# Patient Record
Sex: FEMALE | Race: BLACK OR AFRICAN AMERICAN | NOT HISPANIC OR LATINO | Employment: UNEMPLOYED | URBAN - METROPOLITAN AREA
[De-identification: names, ages, dates, MRNs, and addresses within clinical notes are randomized per-mention and may not be internally consistent; named-entity substitution may affect disease eponyms.]

---

## 2023-10-06 ENCOUNTER — APPOINTMENT (OUTPATIENT)
Dept: CARDIOLOGY | Facility: HOSPITAL | Age: 48
DRG: 640 | End: 2023-10-06
Payer: COMMERCIAL

## 2023-10-06 ENCOUNTER — APPOINTMENT (OUTPATIENT)
Dept: RADIOLOGY | Facility: HOSPITAL | Age: 48
DRG: 640 | End: 2023-10-06
Payer: COMMERCIAL

## 2023-10-06 ENCOUNTER — HOSPITAL ENCOUNTER (OUTPATIENT)
Facility: HOSPITAL | Age: 48
Setting detail: OBSERVATION
LOS: 1 days | Discharge: HOME | DRG: 640 | End: 2023-10-09
Attending: EMERGENCY MEDICINE | Admitting: STUDENT IN AN ORGANIZED HEALTH CARE EDUCATION/TRAINING PROGRAM
Payer: COMMERCIAL

## 2023-10-06 DIAGNOSIS — R41.82 ALTERED MENTAL STATUS: Primary | ICD-10-CM

## 2023-10-06 DIAGNOSIS — R40.4 TRANSIENT ALTERATION OF AWARENESS: ICD-10-CM

## 2023-10-06 DIAGNOSIS — E11.9 TYPE 2 DIABETES MELLITUS WITHOUT COMPLICATION, WITHOUT LONG-TERM CURRENT USE OF INSULIN (MULTI): ICD-10-CM

## 2023-10-06 PROBLEM — N18.6 ESRD (END STAGE RENAL DISEASE) ON DIALYSIS (MULTI): Status: ACTIVE | Noted: 2023-09-12

## 2023-10-06 PROBLEM — Z99.2 ESRD (END STAGE RENAL DISEASE) ON DIALYSIS (MULTI): Status: ACTIVE | Noted: 2023-09-12

## 2023-10-06 LAB
ALBUMIN SERPL BCP-MCNC: 3.9 G/DL (ref 3.4–5)
ALP SERPL-CCNC: 32 U/L (ref 33–110)
ALT SERPL W P-5'-P-CCNC: 8 U/L (ref 7–45)
ANION GAP BLDV CALCULATED.4IONS-SCNC: 8 MMOL/L (ref 10–25)
ANION GAP SERPL CALC-SCNC: 17 MMOL/L (ref 10–20)
APPEARANCE UR: CLEAR
AST SERPL W P-5'-P-CCNC: 31 U/L (ref 9–39)
ATRIAL RATE: 67 BPM
BASE EXCESS BLDV CALC-SCNC: 4 MMOL/L (ref -2–3)
BASOPHILS # BLD AUTO: 0.03 X10*3/UL (ref 0–0.1)
BASOPHILS NFR BLD AUTO: 0.8 %
BILIRUB SERPL-MCNC: 1.4 MG/DL (ref 0–1.2)
BILIRUB UR STRIP.AUTO-MCNC: NEGATIVE MG/DL
BNP SERPL-MCNC: 1132 PG/ML (ref 0–99)
BODY TEMPERATURE: 37 DEGREES CELSIUS
BUN SERPL-MCNC: 29 MG/DL (ref 6–23)
CA-I BLDV-SCNC: 1.15 MMOL/L (ref 1.1–1.33)
CALCIUM SERPL-MCNC: 9.5 MG/DL (ref 8.6–10.6)
CHLORIDE BLDV-SCNC: 101 MMOL/L (ref 98–107)
CHLORIDE SERPL-SCNC: 99 MMOL/L (ref 98–107)
CO2 SERPL-SCNC: 26 MMOL/L (ref 21–32)
COLOR UR: YELLOW
CREAT SERPL-MCNC: 7.45 MG/DL (ref 0.5–1.05)
CREAT SERPL-MCNC: 7.62 MG/DL (ref 0.5–1.05)
EOSINOPHIL # BLD AUTO: 0.07 X10*3/UL (ref 0–0.7)
EOSINOPHIL NFR BLD AUTO: 2 %
ERYTHROCYTE [DISTWIDTH] IN BLOOD BY AUTOMATED COUNT: 18.7 % (ref 11.5–14.5)
GFR SERPL CREATININE-BSD FRML MDRD: 6 ML/MIN/1.73M*2
GFR SERPL CREATININE-BSD FRML MDRD: 6 ML/MIN/1.73M*2
GLUCOSE BLD MANUAL STRIP-MCNC: 227 MG/DL (ref 74–99)
GLUCOSE BLDV-MCNC: 192 MG/DL (ref 74–99)
GLUCOSE SERPL-MCNC: 172 MG/DL (ref 74–99)
GLUCOSE UR STRIP.AUTO-MCNC: ABNORMAL MG/DL
HCO3 BLDV-SCNC: 30.2 MMOL/L (ref 22–26)
HCT VFR BLD AUTO: 37.9 % (ref 36–46)
HCT VFR BLD EST: 37 % (ref 36–46)
HGB BLD-MCNC: 11.6 G/DL (ref 12–16)
HGB BLDV-MCNC: 12.4 G/DL (ref 12–16)
IMM GRANULOCYTES # BLD AUTO: 0.01 X10*3/UL (ref 0–0.7)
IMM GRANULOCYTES NFR BLD AUTO: 0.3 % (ref 0–0.9)
INHALED O2 CONCENTRATION: 97 %
KETONES UR STRIP.AUTO-MCNC: NEGATIVE MG/DL
LACTATE BLDV-SCNC: 0.9 MMOL/L (ref 0.4–2)
LEUKOCYTE ESTERASE UR QL STRIP.AUTO: NEGATIVE
LYMPHOCYTES # BLD AUTO: 0.66 X10*3/UL (ref 1.2–4.8)
LYMPHOCYTES NFR BLD AUTO: 18.5 %
MAGNESIUM SERPL-MCNC: 2.02 MG/DL (ref 1.6–2.4)
MCH RBC QN AUTO: 27.6 PG (ref 26–34)
MCHC RBC AUTO-ENTMCNC: 30.6 G/DL (ref 32–36)
MCV RBC AUTO: 90 FL (ref 80–100)
MONOCYTES # BLD AUTO: 0.32 X10*3/UL (ref 0.1–1)
MONOCYTES NFR BLD AUTO: 9 %
NEUTROPHILS # BLD AUTO: 2.47 X10*3/UL (ref 1.2–7.7)
NEUTROPHILS NFR BLD AUTO: 69.4 %
NITRITE UR QL STRIP.AUTO: NEGATIVE
NRBC BLD-RTO: 0 /100 WBCS (ref 0–0)
OXYHGB MFR BLDV: 62.6 % (ref 45–75)
P AXIS: 56 DEGREES
P OFFSET: 185 MS
P ONSET: 118 MS
PCO2 BLDV: 51 MM HG (ref 41–51)
PH BLDV: 7.38 PH (ref 7.33–7.43)
PH UR STRIP.AUTO: 7 [PH]
PLATELET # BLD AUTO: 122 X10*3/UL (ref 150–450)
PMV BLD AUTO: ABNORMAL FL
PO2 BLDV: 44 MM HG (ref 35–45)
POTASSIUM BLDV-SCNC: 5.1 MMOL/L (ref 3.5–5.3)
POTASSIUM SERPL-SCNC: 4.9 MMOL/L (ref 3.5–5.3)
PR INTERVAL: 184 MS
PROT SERPL-MCNC: 8 G/DL (ref 6.4–8.2)
PROT UR STRIP.AUTO-MCNC: ABNORMAL MG/DL
Q ONSET: 210 MS
QRS COUNT: 11 BEATS
QRS DURATION: 98 MS
QT INTERVAL: 466 MS
QTC CALCULATION(BAZETT): 492 MS
QTC FREDERICIA: 483 MS
R AXIS: 30 DEGREES
RBC # BLD AUTO: 4.21 X10*6/UL (ref 4–5.2)
RBC # UR STRIP.AUTO: NEGATIVE /UL
RBC #/AREA URNS AUTO: NORMAL /HPF
SAO2 % BLDV: 65 % (ref 45–75)
SARS-COV-2 RNA RESP QL NAA+PROBE: NOT DETECTED
SODIUM BLDV-SCNC: 134 MMOL/L (ref 136–145)
SODIUM SERPL-SCNC: 137 MMOL/L (ref 136–145)
SP GR UR STRIP.AUTO: 1.02
T AXIS: 62 DEGREES
T OFFSET: 443 MS
UROBILINOGEN UR STRIP.AUTO-MCNC: <2 MG/DL
VENTRICULAR RATE: 67 BPM
WBC # BLD AUTO: 3.6 X10*3/UL (ref 4.4–11.3)
WBC #/AREA URNS AUTO: NORMAL /HPF

## 2023-10-06 PROCEDURE — 83880 ASSAY OF NATRIURETIC PEPTIDE: CPT | Performed by: EMERGENCY MEDICINE

## 2023-10-06 PROCEDURE — S0119 ONDANSETRON 4 MG: HCPCS | Performed by: EMERGENCY MEDICINE

## 2023-10-06 PROCEDURE — 2500000004 HC RX 250 GENERAL PHARMACY W/ HCPCS (ALT 636 FOR OP/ED): Performed by: EMERGENCY MEDICINE

## 2023-10-06 PROCEDURE — 83036 HEMOGLOBIN GLYCOSYLATED A1C: CPT | Performed by: NURSE PRACTITIONER

## 2023-10-06 PROCEDURE — 82947 ASSAY GLUCOSE BLOOD QUANT: CPT

## 2023-10-06 PROCEDURE — 2500000004 HC RX 250 GENERAL PHARMACY W/ HCPCS (ALT 636 FOR OP/ED): Mod: MUE | Performed by: NURSE PRACTITIONER

## 2023-10-06 PROCEDURE — 99285 EMERGENCY DEPT VISIT HI MDM: CPT | Performed by: EMERGENCY MEDICINE

## 2023-10-06 PROCEDURE — 74177 CT ABD & PELVIS W/CONTRAST: CPT | Performed by: RADIOLOGY

## 2023-10-06 PROCEDURE — G0378 HOSPITAL OBSERVATION PER HR: HCPCS

## 2023-10-06 PROCEDURE — 70496 CT ANGIOGRAPHY HEAD: CPT | Mod: MG

## 2023-10-06 PROCEDURE — 74177 CT ABD & PELVIS W/CONTRAST: CPT | Mod: MG

## 2023-10-06 PROCEDURE — 85025 COMPLETE CBC W/AUTO DIFF WBC: CPT | Performed by: EMERGENCY MEDICINE

## 2023-10-06 PROCEDURE — 96372 THER/PROPH/DIAG INJ SC/IM: CPT | Performed by: NURSE PRACTITIONER

## 2023-10-06 PROCEDURE — 99223 1ST HOSP IP/OBS HIGH 75: CPT | Performed by: NURSE PRACTITIONER

## 2023-10-06 PROCEDURE — 84132 ASSAY OF SERUM POTASSIUM: CPT | Performed by: EMERGENCY MEDICINE

## 2023-10-06 PROCEDURE — 70498 CT ANGIOGRAPHY NECK: CPT | Performed by: RADIOLOGY

## 2023-10-06 PROCEDURE — 70450 CT HEAD/BRAIN W/O DYE: CPT | Mod: 59,MG

## 2023-10-06 PROCEDURE — 93005 ELECTROCARDIOGRAM TRACING: CPT

## 2023-10-06 PROCEDURE — 83735 ASSAY OF MAGNESIUM: CPT | Performed by: EMERGENCY MEDICINE

## 2023-10-06 PROCEDURE — 2550000001 HC RX 255 CONTRASTS: Mod: SE | Performed by: EMERGENCY MEDICINE

## 2023-10-06 PROCEDURE — 70496 CT ANGIOGRAPHY HEAD: CPT | Performed by: RADIOLOGY

## 2023-10-06 PROCEDURE — 70450 CT HEAD/BRAIN W/O DYE: CPT | Performed by: RADIOLOGY

## 2023-10-06 PROCEDURE — 36415 COLL VENOUS BLD VENIPUNCTURE: CPT | Performed by: EMERGENCY MEDICINE

## 2023-10-06 PROCEDURE — 1100000001 HC PRIVATE ROOM DAILY

## 2023-10-06 PROCEDURE — 2500000001 HC RX 250 WO HCPCS SELF ADMINISTERED DRUGS (ALT 637 FOR MEDICARE OP): Performed by: NURSE PRACTITIONER

## 2023-10-06 PROCEDURE — 80053 COMPREHEN METABOLIC PANEL: CPT | Performed by: EMERGENCY MEDICINE

## 2023-10-06 PROCEDURE — 81001 URINALYSIS AUTO W/SCOPE: CPT | Performed by: EMERGENCY MEDICINE

## 2023-10-06 PROCEDURE — 2500000005 HC RX 250 GENERAL PHARMACY W/O HCPCS: Mod: SE | Performed by: EMERGENCY MEDICINE

## 2023-10-06 PROCEDURE — 87635 SARS-COV-2 COVID-19 AMP PRB: CPT | Performed by: EMERGENCY MEDICINE

## 2023-10-06 PROCEDURE — 2500000002 HC RX 250 W HCPCS SELF ADMINISTERED DRUGS (ALT 637 FOR MEDICARE OP, ALT 636 FOR OP/ED): Performed by: NURSE PRACTITIONER

## 2023-10-06 PROCEDURE — G1004 CDSM NDSC: HCPCS

## 2023-10-06 PROCEDURE — 82565 ASSAY OF CREATININE: CPT | Mod: 59 | Performed by: EMERGENCY MEDICINE

## 2023-10-06 RX ORDER — GABAPENTIN 300 MG/1
300 CAPSULE ORAL DAILY
COMMUNITY
Start: 2023-03-13 | End: 2024-03-11 | Stop reason: SINTOL

## 2023-10-06 RX ORDER — INSULIN GLARGINE 100 [IU]/ML
10 INJECTION, SOLUTION SUBCUTANEOUS NIGHTLY
COMMUNITY
Start: 2016-01-01 | End: 2023-10-06

## 2023-10-06 RX ORDER — ACETAMINOPHEN 500 MG
2000 TABLET ORAL
COMMUNITY
Start: 2023-08-08 | End: 2023-11-06

## 2023-10-06 RX ORDER — HYDRALAZINE HYDROCHLORIDE 25 MG/1
25 TABLET, FILM COATED ORAL 3 TIMES DAILY
COMMUNITY
End: 2023-10-09 | Stop reason: HOSPADM

## 2023-10-06 RX ORDER — ZOLPIDEM TARTRATE 10 MG/1
10 TABLET ORAL NIGHTLY PRN
COMMUNITY
End: 2023-10-09 | Stop reason: HOSPADM

## 2023-10-06 RX ORDER — HYDROCHLOROTHIAZIDE 25 MG/1
12.5 TABLET ORAL DAILY
Status: DISCONTINUED | OUTPATIENT
Start: 2023-10-06 | End: 2023-10-08

## 2023-10-06 RX ORDER — SIMVASTATIN 20 MG/1
20 TABLET, FILM COATED ORAL
COMMUNITY
Start: 2016-04-12

## 2023-10-06 RX ORDER — INSULIN GLARGINE 100 [IU]/ML
25 INJECTION, SOLUTION SUBCUTANEOUS 2 TIMES DAILY
COMMUNITY
End: 2023-10-09 | Stop reason: HOSPADM

## 2023-10-06 RX ORDER — CALCIUM CARBONATE 500(1250)
1 TABLET,CHEWABLE ORAL
COMMUNITY
Start: 2015-09-20

## 2023-10-06 RX ORDER — FELODIPINE 10 MG/1
10 TABLET, EXTENDED RELEASE ORAL
COMMUNITY
Start: 2023-08-08 | End: 2024-03-11

## 2023-10-06 RX ORDER — GABAPENTIN 300 MG/1
300 CAPSULE ORAL NIGHTLY
Status: DISCONTINUED | OUTPATIENT
Start: 2023-10-06 | End: 2023-10-09 | Stop reason: HOSPADM

## 2023-10-06 RX ORDER — ROPINIROLE 1 MG/1
1 TABLET, FILM COATED ORAL NIGHTLY
COMMUNITY

## 2023-10-06 RX ORDER — CALCITRIOL 0.25 UG/1
0.25 CAPSULE ORAL DAILY
Status: DISCONTINUED | OUTPATIENT
Start: 2023-10-06 | End: 2023-10-09 | Stop reason: HOSPADM

## 2023-10-06 RX ORDER — SIMVASTATIN 20 MG/1
20 TABLET, FILM COATED ORAL NIGHTLY
Status: DISCONTINUED | OUTPATIENT
Start: 2023-10-06 | End: 2023-10-09 | Stop reason: HOSPADM

## 2023-10-06 RX ORDER — FERROUS SULFATE 325(65) MG
65 TABLET ORAL 2 TIMES DAILY
COMMUNITY

## 2023-10-06 RX ORDER — ROPINIROLE 1 MG/1
1 TABLET, FILM COATED ORAL NIGHTLY
Status: DISCONTINUED | OUTPATIENT
Start: 2023-10-06 | End: 2023-10-09 | Stop reason: HOSPADM

## 2023-10-06 RX ORDER — INSULIN ASPART 100 [IU]/ML
0-10 INJECTION, SOLUTION INTRAVENOUS; SUBCUTANEOUS
COMMUNITY

## 2023-10-06 RX ORDER — ZOLPIDEM TARTRATE 5 MG/1
10 TABLET ORAL NIGHTLY PRN
Status: DISCONTINUED | OUTPATIENT
Start: 2023-10-06 | End: 2023-10-09 | Stop reason: HOSPADM

## 2023-10-06 RX ORDER — LANSOPRAZOLE 30 MG/1
30 CAPSULE, DELAYED RELEASE ORAL
COMMUNITY
Start: 2016-03-10 | End: 2023-10-06 | Stop reason: ALTCHOICE

## 2023-10-06 RX ORDER — HYDROXYZINE HYDROCHLORIDE 10 MG/1
10 TABLET, FILM COATED ORAL EVERY 8 HOURS PRN
COMMUNITY
Start: 2023-09-12

## 2023-10-06 RX ORDER — INSULIN LISPRO 100 [IU]/ML
0-10 INJECTION, SOLUTION INTRAVENOUS; SUBCUTANEOUS
Status: DISCONTINUED | OUTPATIENT
Start: 2023-10-06 | End: 2023-10-09 | Stop reason: HOSPADM

## 2023-10-06 RX ORDER — PREGABALIN 75 MG/1
75 CAPSULE ORAL 2 TIMES DAILY
COMMUNITY
Start: 2016-04-20 | End: 2024-03-11 | Stop reason: SINTOL

## 2023-10-06 RX ORDER — CALCIUM ACETATE 667 MG
2668 TABLET ORAL
Status: DISCONTINUED | OUTPATIENT
Start: 2023-10-07 | End: 2023-10-07

## 2023-10-06 RX ORDER — INSULIN GLARGINE 100 [IU]/ML
30 INJECTION, SOLUTION SUBCUTANEOUS EVERY MORNING
COMMUNITY
Start: 2016-01-20 | End: 2023-10-06

## 2023-10-06 RX ORDER — CHOLECALCIFEROL (VITAMIN D3) 25 MCG
2000 TABLET ORAL DAILY
Status: DISCONTINUED | OUTPATIENT
Start: 2023-10-07 | End: 2023-10-09 | Stop reason: HOSPADM

## 2023-10-06 RX ORDER — FERROUS SULFATE 325(65) MG
65 TABLET ORAL 2 TIMES DAILY
Status: DISCONTINUED | OUTPATIENT
Start: 2023-10-06 | End: 2023-10-08

## 2023-10-06 RX ORDER — CALCIUM ACETATE 667 MG
2001 TABLET ORAL
COMMUNITY
Start: 2023-07-11 | End: 2024-04-25

## 2023-10-06 RX ORDER — LOPERAMIDE HYDROCHLORIDE 2 MG/1
2 CAPSULE ORAL 4 TIMES DAILY PRN
Status: DISCONTINUED | OUTPATIENT
Start: 2023-10-06 | End: 2023-10-09 | Stop reason: HOSPADM

## 2023-10-06 RX ORDER — CARVEDILOL 25 MG/1
25 TABLET ORAL 2 TIMES DAILY
Status: DISCONTINUED | OUTPATIENT
Start: 2023-10-06 | End: 2023-10-09 | Stop reason: HOSPADM

## 2023-10-06 RX ORDER — DICLOFENAC SODIUM 10 MG/G
4 GEL TOPICAL 2 TIMES DAILY PRN
COMMUNITY
Start: 2016-02-25

## 2023-10-06 RX ORDER — FUROSEMIDE 20 MG/1
20 TABLET ORAL
Status: DISCONTINUED | OUTPATIENT
Start: 2023-10-07 | End: 2023-10-09 | Stop reason: HOSPADM

## 2023-10-06 RX ORDER — METOCLOPRAMIDE 10 MG/1
10 TABLET ORAL 2 TIMES DAILY
COMMUNITY
Start: 2016-03-03

## 2023-10-06 RX ORDER — VALSARTAN 320 MG/1
320 TABLET ORAL DAILY
Status: DISCONTINUED | OUTPATIENT
Start: 2023-10-06 | End: 2023-10-09 | Stop reason: HOSPADM

## 2023-10-06 RX ORDER — AMLODIPINE BESYLATE 10 MG/1
10 TABLET ORAL DAILY
Status: DISCONTINUED | OUTPATIENT
Start: 2023-10-06 | End: 2023-10-09 | Stop reason: HOSPADM

## 2023-10-06 RX ORDER — VALSARTAN AND HYDROCHLOROTHIAZIDE 320; 12.5 MG/1; MG/1
1 TABLET, FILM COATED ORAL DAILY
COMMUNITY
End: 2024-05-07 | Stop reason: ALTCHOICE

## 2023-10-06 RX ORDER — CARVEDILOL 25 MG/1
25 TABLET ORAL 2 TIMES DAILY
COMMUNITY

## 2023-10-06 RX ORDER — HYDROXYZINE HYDROCHLORIDE 25 MG/1
25 TABLET, FILM COATED ORAL EVERY 6 HOURS PRN
Status: DISCONTINUED | OUTPATIENT
Start: 2023-10-06 | End: 2023-10-09 | Stop reason: HOSPADM

## 2023-10-06 RX ORDER — ONDANSETRON 4 MG/1
4 TABLET, ORALLY DISINTEGRATING ORAL ONCE
Status: COMPLETED | OUTPATIENT
Start: 2023-10-06 | End: 2023-10-06

## 2023-10-06 RX ORDER — NAPROXEN SODIUM 220 MG/1
81 TABLET, FILM COATED ORAL
COMMUNITY
Start: 2016-05-26

## 2023-10-06 RX ORDER — DEXTROSE MONOHYDRATE 100 MG/ML
0.3 INJECTION, SOLUTION INTRAVENOUS ONCE AS NEEDED
Status: DISCONTINUED | OUTPATIENT
Start: 2023-10-06 | End: 2023-10-06

## 2023-10-06 RX ORDER — VALSARTAN AND HYDROCHLOROTHIAZIDE 160; 25 MG/1; MG/1
1 TABLET ORAL
COMMUNITY
Start: 2016-01-08 | End: 2023-10-06

## 2023-10-06 RX ORDER — INSULIN ASPART 100 [IU]/ML
5 INJECTION, SOLUTION INTRAVENOUS; SUBCUTANEOUS
COMMUNITY
End: 2023-10-09 | Stop reason: HOSPADM

## 2023-10-06 RX ORDER — INSULIN GLARGINE 100 [IU]/ML
25 INJECTION, SOLUTION SUBCUTANEOUS 2 TIMES DAILY
Status: DISCONTINUED | OUTPATIENT
Start: 2023-10-06 | End: 2023-10-08

## 2023-10-06 RX ORDER — HYDRALAZINE HYDROCHLORIDE 25 MG/1
25 TABLET, FILM COATED ORAL 3 TIMES DAILY
Status: DISCONTINUED | OUTPATIENT
Start: 2023-10-06 | End: 2023-10-09 | Stop reason: HOSPADM

## 2023-10-06 RX ORDER — CALCITRIOL 0.25 UG/1
0.25 CAPSULE ORAL DAILY
COMMUNITY
End: 2023-10-09 | Stop reason: HOSPADM

## 2023-10-06 RX ORDER — ONDANSETRON HYDROCHLORIDE 2 MG/ML
4 INJECTION, SOLUTION INTRAVENOUS EVERY 6 HOURS PRN
Status: DISCONTINUED | OUTPATIENT
Start: 2023-10-06 | End: 2023-10-09 | Stop reason: HOSPADM

## 2023-10-06 RX ORDER — FUROSEMIDE 20 MG/1
20 TABLET ORAL
COMMUNITY
Start: 2016-02-25

## 2023-10-06 RX ORDER — DEXTROSE 50 % IN WATER (D50W) INTRAVENOUS SYRINGE
25
Status: DISCONTINUED | OUTPATIENT
Start: 2023-10-06 | End: 2023-10-06

## 2023-10-06 RX ORDER — DICLOFENAC SODIUM 10 MG/G
4 GEL TOPICAL 2 TIMES DAILY PRN
Status: DISCONTINUED | OUTPATIENT
Start: 2023-10-06 | End: 2023-10-09 | Stop reason: HOSPADM

## 2023-10-06 RX ORDER — OXYCODONE AND ACETAMINOPHEN 5; 325 MG/1; MG/1
1 TABLET ORAL EVERY 8 HOURS PRN
Status: ACTIVE | OUTPATIENT
Start: 2023-10-06 | End: 2023-10-07

## 2023-10-06 RX ORDER — CARVEDILOL 12.5 MG/1
12.5 TABLET ORAL 2 TIMES DAILY
COMMUNITY
Start: 2016-01-08 | End: 2023-10-06

## 2023-10-06 RX ORDER — NAPROXEN SODIUM 220 MG/1
81 TABLET, FILM COATED ORAL DAILY
Status: DISCONTINUED | OUTPATIENT
Start: 2023-10-06 | End: 2023-10-09 | Stop reason: HOSPADM

## 2023-10-06 RX ORDER — DEXTROSE MONOHYDRATE 100 MG/ML
0.3 INJECTION, SOLUTION INTRAVENOUS ONCE AS NEEDED
Status: DISCONTINUED | OUTPATIENT
Start: 2023-10-06 | End: 2023-10-09 | Stop reason: HOSPADM

## 2023-10-06 RX ADMIN — HYDROXYZINE HYDROCHLORIDE 25 MG: 25 TABLET, FILM COATED ORAL at 23:48

## 2023-10-06 RX ADMIN — ASPIRIN 81 MG CHEWABLE TABLET 81 MG: 81 TABLET CHEWABLE at 20:14

## 2023-10-06 RX ADMIN — ONDANSETRON 4 MG: 4 TABLET, ORALLY DISINTEGRATING ORAL at 09:08

## 2023-10-06 RX ADMIN — AMLODIPINE BESYLATE 10 MG: 10 TABLET ORAL at 20:14

## 2023-10-06 RX ADMIN — CALCITRIOL CAPSULES 0.25 MCG 0.25 MCG: 0.25 CAPSULE ORAL at 23:17

## 2023-10-06 RX ADMIN — CARVEDILOL 25 MG: 25 TABLET, FILM COATED ORAL at 20:14

## 2023-10-06 RX ADMIN — SIMVASTATIN 20 MG: 20 TABLET, FILM COATED ORAL at 23:48

## 2023-10-06 RX ADMIN — HYDRALAZINE HYDROCHLORIDE 25 MG: 25 TABLET, FILM COATED ORAL at 23:16

## 2023-10-06 RX ADMIN — INSULIN GLARGINE 25 UNITS: 100 INJECTION, SOLUTION SUBCUTANEOUS at 23:18

## 2023-10-06 RX ADMIN — IOHEXOL 110 ML: 350 INJECTION, SOLUTION INTRAVENOUS at 14:59

## 2023-10-06 RX ADMIN — FERROUS SULFATE TAB 325 MG (65 MG ELEMENTAL FE) 65 MG OF IRON: 325 (65 FE) TAB at 23:17

## 2023-10-06 RX ADMIN — ROPINIROLE HYDROCHLORIDE 1 MG: 1 TABLET, FILM COATED ORAL at 23:17

## 2023-10-06 RX ADMIN — GABAPENTIN 300 MG: 300 CAPSULE ORAL at 23:50

## 2023-10-06 ASSESSMENT — LIFESTYLE VARIABLES
EVER FELT BAD OR GUILTY ABOUT YOUR DRINKING: NO
HAVE YOU EVER FELT YOU SHOULD CUT DOWN ON YOUR DRINKING: NO
EVER HAD A DRINK FIRST THING IN THE MORNING TO STEADY YOUR NERVES TO GET RID OF A HANGOVER: NO
HAVE PEOPLE ANNOYED YOU BY CRITICIZING YOUR DRINKING: NO

## 2023-10-06 ASSESSMENT — ENCOUNTER SYMPTOMS
FATIGUE: 1
FLANK PAIN: 0
PSYCHIATRIC NEGATIVE: 1
EYES NEGATIVE: 1
DYSURIA: 0
TROUBLE SWALLOWING: 0
NAUSEA: 1
NUMBNESS: 0
FREQUENCY: 0
ABDOMINAL PAIN: 0
COUGH: 0
SEIZURES: 0
POLYDIPSIA: 0
DIARRHEA: 1
CHILLS: 0
DIAPHORESIS: 0
SHORTNESS OF BREATH: 1
APPETITE CHANGE: 0
MUSCULOSKELETAL NEGATIVE: 1
VOMITING: 1
FEVER: 0
PALPITATIONS: 0
POLYPHAGIA: 0
DIZZINESS: 0
SORE THROAT: 0
CONSTIPATION: 0
WHEEZING: 0
BLOOD IN STOOL: 0
DIFFICULTY URINATING: 0
WEAKNESS: 0

## 2023-10-06 ASSESSMENT — PAIN - FUNCTIONAL ASSESSMENT
PAIN_FUNCTIONAL_ASSESSMENT: 0-10
PAIN_FUNCTIONAL_ASSESSMENT: 0-10

## 2023-10-06 ASSESSMENT — PAIN SCALES - GENERAL
PAINLEVEL_OUTOF10: 0 - NO PAIN
PAINLEVEL_OUTOF10: 0 - NO PAIN

## 2023-10-06 ASSESSMENT — COLUMBIA-SUICIDE SEVERITY RATING SCALE - C-SSRS
6. HAVE YOU EVER DONE ANYTHING, STARTED TO DO ANYTHING, OR PREPARED TO DO ANYTHING TO END YOUR LIFE?: NO
1. IN THE PAST MONTH, HAVE YOU WISHED YOU WERE DEAD OR WISHED YOU COULD GO TO SLEEP AND NOT WAKE UP?: NO
2. HAVE YOU ACTUALLY HAD ANY THOUGHTS OF KILLING YOURSELF?: NO

## 2023-10-06 NOTE — ED PROVIDER NOTES
"HPI   Chief Complaint   Patient presents with    Flu Symptoms       Patient is a 48yo  with a PMH of ESRD (T/Th/Sa), HTN, insulin dependent T2DM, and restless leg syndrome presenting to the ED today with complaints of increasing sleepiness, nausea, vomiting, and diarrhea since yesterday after missing her HD appointment. History is limited by patient's somnolence during discussion. Patient reports yesterday she was sleepy/slow moving which caused her to miss her transportation to dialysis. She reports that this morning, her sleepiness has progressed, and she has developed nausea, vomiting, and diarrhea. She denies any headache, chest pain, shortness of breath, abdominal pain, cough, congestion, rhinorrhea, melena, or hematochezia. Patient has no recent travel or sick contacts. Patient denies any alcohol use or illicit substances, although she does report taking a medication that starts with \"ZO\" that she takes regularly last night that helps her sleep. Upon chart review, as of January 2023, the patient was not on any sleep medications that match that description. Patient is unable to report current medications or her family physician. Patient lives at home with her two kids (age 13 and 15).     After initial evaluation, I spoke with the patient's primary care physician, Dr. Rodriguez. Her PCP informed that recently the patient has been having episodes of full body pruritus that they have been trying to treat. He doesn't think it to be from a uremic cause due to her consistent dialysis, but has been treating the symptom with hydroxyzine. Patient has also noted that she is taking benadryl OTC on top of this. PCP mentioned that patient is currently taking hydroxyzine, benadryl OTC, ambien, gabapentin, and ropinirole at night. PCP believes her somnolence may be an effect of polypharmacy and plans on addressing her medications outpatient.    Son (Isaías Bhakta) [phone]    Medications (per chart review " 1/11/2023):  Pregabalin 75mg BID  Simvastatin 220mg every day  Lansoprazole 30mg every day  Metoclopramide 10mg BID  Diclofenac sodium 1% topical gel  Furosemide 20mg every day  Metoprolol 25mg BID  Novolog 8-14 units under skin TID  Lantus 30 units at 8am and 10 units at 10pm  Valsartan-hydrochlorothiazide 160-25mg every day  Carvedilol 12.5 BID  Aspirin-acetaminophen-caffeine [phone] PRN pain  Lidocaine patch 5% 1 patch/24 hours  Calcium 500mg every day  Docusate sodium 100mg BID  Aspirin 81mg every day           History provided by:  Patient and medical records  History limited by:  Mental status change   used: No                        Clay Coma Scale Score: 15                  Patient History   No past medical history on file.  No past surgical history on file.  No family history on file.  Social History     Tobacco Use    Smoking status: Not on file    Smokeless tobacco: Not on file   Substance Use Topics    Alcohol use: Not on file    Drug use: Not on file       Physical Exam   ED Triage Vitals [10/06/23 0824]   Temp Heart Rate Resp BP   36.4 °C (97.5 °F) 64 18 (!) 169/92      SpO2 Temp src Heart Rate Source Patient Position   96 % -- Monitor --      BP Location FiO2 (%)     Right arm --       Physical Exam  Vitals and nursing note reviewed.   Constitutional:       General: She is not in acute distress.     Appearance: She is well-developed. She is obese.      Comments: Pt somnolent on exam, actively falling asleep during history taking, unable to finish sentences due to somnolence   HENT:      Head: Normocephalic and atraumatic.      Nose: Nose normal.      Mouth/Throat:      Mouth: Mucous membranes are moist.      Pharynx: Oropharynx is clear.   Eyes:      Extraocular Movements: Extraocular movements intact.      Conjunctiva/sclera: Conjunctivae normal.      Comments: Miotic pupils   Cardiovascular:      Rate and Rhythm: Normal rate and regular rhythm.      Pulses: Normal pulses.       Heart sounds: Normal heart sounds. No murmur heard.  Pulmonary:      Effort: Pulmonary effort is normal. No respiratory distress.      Breath sounds: Normal breath sounds.   Abdominal:      General: Abdomen is flat. There is no distension.      Palpations: Abdomen is soft.      Tenderness: There is no abdominal tenderness.   Musculoskeletal:         General: No swelling.      Cervical back: Neck supple.   Skin:     General: Skin is warm and dry.      Capillary Refill: Capillary refill takes less than 2 seconds.   Neurological:      General: No focal deficit present.      Mental Status: She is oriented to person, place, and time.      Cranial Nerves: No cranial nerve deficit.      Sensory: No sensory deficit.      Motor: No weakness.      Comments: Patient oriented to person, date, and situation. Patient is unaware what hospital she is in.     Initial finger to nose testing was unable to be tested secondary to somnolence. Heel to shin was normal.    Patient was somnolent and falling asleep during history taking.   Psychiatric:         Mood and Affect: Mood normal.       ED Course & MDM   Diagnoses as of 10/07/23 4628   Altered mental status       Medical Decision Making  Patient is a 48yo  with a PMH of ESRD (T/Th/Sa), HTN, insulin dependent T2DM, and restless leg syndrome presenting to the ED today with complaints of increasing sleepiness, nausea, vomiting, and diarrhea since yesterday after missing her HD appointment. Upon admission to the ED, patient was found to be vitally stable except some slight hypertension at 169/92. Patient was somnolent on exam with miotic pupils, no focal neuro deficits, and unremarkable heart, lung, and abdominal exam. Labs showed an elevated BNP, Creatinine, and BUN expected in ESRD patient that missed HD. CT Head was unremarkable for any acute intracranial pathology. After speaking with patient's PCP and most of her labs being benign or expected with her skipped HD, patient is  diagnosed with altered mental status likely due to her polypharmacy including hydroxyzine, OTC benadryl, ropinirole, zolpidem, and gabapentin. Miotic pupils do not fit the anticholinergic picture so a CTA head and neck is ordered to rule out pontine stroke.    Patient was signed out at 1530 to student Dr. Scott and attending Dr. Anderson. Patient was vitally stable and less somnolent than initial exam. Patient is pending official reads of her CT abdomen and pelvis and CTA head/neck. Dispo is anticipated admission after CT and CTA are read. Patient has already been denied admission to CDU for observation due to ESRD on HD status.        Procedure  Procedures     Kim Rico  10/07/23 9672

## 2023-10-06 NOTE — PROGRESS NOTES
"Rissa Bhakta is a 47 y.o. female with a PMHx of HTN, T2DM, and ESRD (dialysis tue,Thurs, Sat) presenting with chief complaint of nausea and vomiting.   Patient was signed out to me by Kim Rico, MS4  BNP: 1,132, Cr: 7.45, eGFR:6, BUN:29 other labs were unremarkable   Patient is stable. Vitals show /93 other vitals wnl.  Patient was noted to be lethargic most likely 2/2 to polypharmacy   CT head was unremarkable   CT angio of head and neck was unremarkable   Team noted non focal neuro findings on physical exam   Patient will be admitted to medicine     Objective     Physical Exam  Vitals reviewed.   Constitutional:       General: She is sleeping. She is not in acute distress.  Eyes:      Extraocular Movements:      Right eye: No nystagmus.      Left eye: No nystagmus.   Cardiovascular:      Rate and Rhythm: Normal rate and regular rhythm.      Heart sounds: S1 normal and S2 normal. No murmur heard.     No friction rub.   Pulmonary:      Effort: Pulmonary effort is normal. No respiratory distress.      Breath sounds: Normal breath sounds and air entry. No decreased breath sounds, wheezing, rhonchi or rales.   Abdominal:      Tenderness: There is no abdominal tenderness.   Neurological:      Mental Status: She is lethargic.      Sensory: No sensory deficit.      Motor: Motor function is intact.      Coordination: Finger-Nose-Finger Test abnormal.      Comments: Abnormal finger to nose bilaterally. Decreased sensation in the left lower extremity.          Last Recorded Vitals  Blood pressure (!) 181/93, pulse 70, temperature 36.4 °C (97.5 °F), resp. rate 16, height 1.626 m (5' 4\"), weight 97.5 kg (215 lb), SpO2 98 %.      BLADIMIR PEREZ"

## 2023-10-06 NOTE — ED TRIAGE NOTES
Pt presents to ED today for nausea, weakness, diarrhea. Missed dialysis Thursday due to not feel well. Pt feels more lethargic than normal, and appears very drowsy.

## 2023-10-06 NOTE — H&P
History Of Present Illness  Rissa Bhakta is a 47 y.o. female who presented to the ED  for further evaluation of the following complaints: increased sleepiness, n/v/d, SOB, and missed HD yesterday (10/5). Pt reports that she missed HD yesterday because she was sleepy and moving slow which caused her to miss her dialysis transportation. Pt reports that nausea and vomiting started this morning and she had one episode of emesis prior to ED presentation. Diarrhea has been present on and off for months per report of pt. She denies any recent changes in her diet. Pt was somnolent on arrival and kept falling asleep during pt interview with ED provider. Pt denies any recent sick contacts or travel. ED provider was able to speak with pt's PCP (Dr. Rodriguez) who reports that pt has been complaining of full body pruritus which he has been trying to manage. PCP of pt does not think pruritus is r/t uremic cause because pt consistently gets dialysis. He reports that pt has been taking Hydroxine, OTC Benadryl, Ambien, and Gabapentin outpatient and her somnolence may be r/t polypharmacy. Labs obtained on admit notable for leukopenia, impaired renal function, and anemia. Labs as expected given pt history of ESRD. No acute findings seen on imaging. Decision made to admit pt to medicine service under observation for missed HD and n/v/d.     Past Medical History  Past Medical History:   Diagnosis Date    Anemia associated with chronic renal failure     Diabetes mellitus (CMS/formerly [facility])     ESRD (end stage renal disease) on dialysis (CMS/formerly [facility])     Hyperlipidemia     Hypertension     Insomnia     POTS (postural orthostatic tachycardia syndrome)     Restless leg syndrome        Surgical History  History reviewed. No pertinent surgical history.     Social History  She reports that she has never smoked. She has never used smokeless tobacco. She reports that she does not currently use alcohol after a past usage of about 1.0 standard drink of  alcohol per week. She reports that she does not use drugs.    Family History  No family history on file.     Allergies  Cherry, Coconut flavor, Morphine hcl, Mushroom, and Saluda    Review of Systems   Constitutional:  Positive for fatigue. Negative for appetite change, chills, diaphoresis and fever.   HENT:  Negative for congestion, hearing loss, sore throat and trouble swallowing.    Eyes: Negative.    Respiratory:  Positive for shortness of breath. Negative for cough and wheezing.    Cardiovascular:  Negative for chest pain and palpitations.   Gastrointestinal:  Positive for diarrhea, nausea and vomiting. Negative for abdominal pain, blood in stool and constipation.   Endocrine: Negative for polydipsia, polyphagia and polyuria.   Genitourinary:  Negative for difficulty urinating, dysuria, flank pain and frequency.   Musculoskeletal: Negative.    Skin: Negative.    Neurological:  Negative for dizziness, seizures, syncope, weakness and numbness.   Psychiatric/Behavioral: Negative.          Physical Exam  HENT:      Head: Normocephalic and atraumatic.      Right Ear: External ear normal.      Left Ear: External ear normal.      Nose: Nose normal. No rhinorrhea.      Mouth/Throat:      Mouth: Mucous membranes are moist.      Pharynx: No posterior oropharyngeal erythema.   Eyes:      Extraocular Movements: Extraocular movements intact.      Conjunctiva/sclera: Conjunctivae normal.      Pupils: Pupils are equal, round, and reactive to light.   Cardiovascular:      Rate and Rhythm: Normal rate and regular rhythm.      Pulses: Normal pulses.      Heart sounds: Normal heart sounds. No murmur heard.  Pulmonary:      Effort: Pulmonary effort is normal.      Breath sounds: Normal breath sounds. No wheezing or rales.   Abdominal:      General: Abdomen is flat. Bowel sounds are normal.      Palpations: Abdomen is soft.   Musculoskeletal:         General: Normal range of motion.      Cervical back: Normal range of motion  "and neck supple.   Skin:     General: Skin is warm and dry.      Coloration: Skin is not jaundiced.      Comments: L upper arm AV fistula (+thrill/bruit)   Neurological:      General: No focal deficit present.      Mental Status: She is alert and oriented to person, place, and time. Mental status is at baseline.   Psychiatric:         Mood and Affect: Mood normal.         Behavior: Behavior normal.          Last Recorded Vitals  Blood pressure 157/76, pulse 72, temperature 36.4 °C (97.5 °F), resp. rate 16, height 1.626 m (5' 4\"), weight 97.5 kg (215 lb), SpO2 99 %.    Scheduled medications    Continuous medications  Scheduled medications  amLODIPine, 10 mg, oral, Daily  aspirin, 81 mg, oral, Daily  calcitriol, 0.25 mcg, oral, Daily  [START ON 10/7/2023] calcium acetate, 2,668 mg, oral, TID with meals  carvedilol, 25 mg, oral, BID  [START ON 10/7/2023] cholecalciferol, 2,000 Units, oral, Daily  ferrous sulfate, 65 mg of iron, oral, BID  [START ON 10/7/2023] furosemide, 20 mg, oral, Daily  gabapentin, 300 mg, oral, Nightly  hydrALAZINE, 25 mg, oral, TID  insulin glargine, 25 Units, subcutaneous, BID  insulin lispro, 0-10 Units, subcutaneous, TID with meals  simvastatin, 20 mg, oral, Nightly  valsartan 320 mg, hydroCHLOROthiazide 12.5 mg for Diovan HCT, , oral, Daily      Continuous medications     PRN medications  PRN medications: dextrose 10 % in water (D10W), diclofenac sodium, glucagon, loperamide, ondansetron, zolpidem    PRN medications: dextrose 10 % in water (D10W), diclofenac sodium, glucagon, loperamide, ondansetron, zolpidem    Lab Results   Component Value Date    HGBA1C 8.4 (H) 11/08/2022     Results for orders placed or performed during the hospital encounter of 10/06/23 (from the past 96 hour(s))   CBC and Auto Differential   Result Value Ref Range    WBC 3.6 (L) 4.4 - 11.3 x10*3/uL    nRBC 0.0 0.0 - 0.0 /100 WBCs    RBC 4.21 4.00 - 5.20 x10*6/uL    Hemoglobin 11.6 (L) 12.0 - 16.0 g/dL    Hematocrit 37.9 " 36.0 - 46.0 %    MCV 90 80 - 100 fL    MCH 27.6 26.0 - 34.0 pg    MCHC 30.6 (L) 32.0 - 36.0 g/dL    RDW 18.7 (H) 11.5 - 14.5 %    Platelets 122 (L) 150 - 450 x10*3/uL    MPV      Neutrophils % 69.4 40.0 - 80.0 %    Immature Granulocytes %, Automated 0.3 0.0 - 0.9 %    Lymphocytes % 18.5 13.0 - 44.0 %    Monocytes % 9.0 2.0 - 10.0 %    Eosinophils % 2.0 0.0 - 6.0 %    Basophils % 0.8 0.0 - 2.0 %    Neutrophils Absolute 2.47 1.20 - 7.70 x10*3/uL    Immature Granulocytes Absolute, Automated 0.01 0.00 - 0.70 x10*3/uL    Lymphocytes Absolute 0.66 (L) 1.20 - 4.80 x10*3/uL    Monocytes Absolute 0.32 0.10 - 1.00 x10*3/uL    Eosinophils Absolute 0.07 0.00 - 0.70 x10*3/uL    Basophils Absolute 0.03 0.00 - 0.10 x10*3/uL   Comprehensive metabolic panel   Result Value Ref Range    Glucose 172 (H) 74 - 99 mg/dL    Sodium 137 136 - 145 mmol/L    Potassium 4.9 3.5 - 5.3 mmol/L    Chloride 99 98 - 107 mmol/L    Bicarbonate 26 21 - 32 mmol/L    Anion Gap 17 10 - 20 mmol/L    Urea Nitrogen 29 (H) 6 - 23 mg/dL    Creatinine 7.62 (H) 0.50 - 1.05 mg/dL    eGFR 6 (L) >60 mL/min/1.73m*2    Calcium 9.5 8.6 - 10.6 mg/dL    Albumin 3.9 3.4 - 5.0 g/dL    Alkaline Phosphatase 32 (L) 33 - 110 U/L    Total Protein 8.0 6.4 - 8.2 g/dL    AST 31 9 - 39 U/L    Bilirubin, Total 1.4 (H) 0.0 - 1.2 mg/dL    ALT 8 7 - 45 U/L   Magnesium   Result Value Ref Range    Magnesium 2.02 1.60 - 2.40 mg/dL   Blood Gas Venous Full Panel   Result Value Ref Range    POCT pH, Venous 7.38 7.33 - 7.43 pH    POCT pCO2, Venous 51 41 - 51 mm Hg    POCT pO2, Venous 44 35 - 45 mm Hg    POCT SO2, Venous 65 45 - 75 %    POCT Oxy Hemoglobin, Venous 62.6 45.0 - 75.0 %    POCT Hematocrit Calculated, Venous 37.0 36.0 - 46.0 %    POCT Sodium, Venous 134 (L) 136 - 145 mmol/L    POCT Potassium, Venous 5.1 3.5 - 5.3 mmol/L    POCT Chloride, Venous 101 98 - 107 mmol/L    POCT Ionized Calicum, Venous 1.15 1.10 - 1.33 mmol/L    POCT Glucose, Venous 192 (H) 74 - 99 mg/dL    POCT Lactate,  Venous 0.9 0.4 - 2.0 mmol/L    POCT Base Excess, Venous 4.0 (H) -2.0 - 3.0 mmol/L    POCT HCO3 Calculated, Venous 30.2 (H) 22.0 - 26.0 mmol/L    POCT Hemoglobin, Venous 12.4 12.0 - 16.0 g/dL    POCT Anion Gap, Venous 8.0 (L) 10.0 - 25.0 mmol/L    Patient Temperature 37.0 degrees Celsius    FiO2 97 %   B-Type Natriuretic Peptide   Result Value Ref Range    BNP 1,132 (H) 0 - 99 pg/mL   Sars-CoV-2 PCR, Symptomatic   Result Value Ref Range    Coronavirus 2019, PCR Not Detected Not Detected   Urinalysis with Reflex Microscopic   Result Value Ref Range    Color, Urine Yellow Straw, Yellow    Appearance, Urine Clear Clear    Specific Gravity, Urine 1.016 1.005 - 1.035    pH, Urine 7.0 5.0, 5.5, 6.0, 6.5, 7.0, 7.5, 8.0    Protein, Urine >=500 (3+) (N) NEGATIVE mg/dL    Glucose, Urine 150 (2+) (A) NEGATIVE mg/dL    Blood, Urine NEGATIVE NEGATIVE    Ketones, Urine NEGATIVE NEGATIVE mg/dL    Bilirubin, Urine NEGATIVE NEGATIVE    Urobilinogen, Urine <2.0 <2.0 mg/dL    Nitrite, Urine NEGATIVE NEGATIVE    Leukocyte Esterase, Urine NEGATIVE NEGATIVE   Urinalysis Microscopic Only   Result Value Ref Range    WBC, Urine 1-5 1-5, NONE /HPF    RBC, Urine 1-2 NONE, 1-2, 3-5 /HPF   Creatinine, Serum   Result Value Ref Range    Creatinine 7.45 (H) 0.50 - 1.05 mg/dL    eGFR 6 (L) >60 mL/min/1.73m*2      CT abdomen pelvis w IV contrast    Result Date: 10/6/2023  Interpreted By:  Gee Sterling, STUDY: CT ABDOMEN PELVIS W IV CONTRAST;  10/6/2023 2:59 pm   INDICATION: Per EMR: 47-year-old female history of ESRD on hemodialysis and type 2 diabetes presenting with nausea vomiting and diarrhea since missing her hemodialysis appointment yesterday.   COMPARISON: None.   ACCESSION NUMBER(S): [id number]   ORDERING CLINICIAN: INGRID BORJAS   TECHNIQUE: CT of the abdomen and pelvis was performed.  Standard contiguous axial images were obtained at 3 mm slice thickness through the abdomen and pelvis. Coronal and sagittal reconstructions at 3 mm  slice thickness were performed.   110 ml of contrast Omnipaque 350 were administered intravenously without immediate complication.   FINDINGS: LOWER CHEST: Examination of the lung parenchyma is limited by motion artifact. Within those limitations: The visualized lung base is unremarkable. The heart is enlarged without pericardial effusion. No pleural effusion is present. Visualized distal esophagus appears normal.   ABDOMEN:   LIVER: The liver is micronodular appearing and enlarged measuring 21.3 cm in craniocaudal dimension without evidence of focal liver lesions. There is mild periportal edema (series 201, image 41)   BILE DUCTS: The intrahepatic and extrahepatic ducts are not dilated.   GALLBLADDER: There is numerous radiopaque stones within the gallbladder lumen with associated mucosal hyperenhancement wall thickening and pericholecystic fluid (series 201, image 66).   PANCREAS: The pancreas appears unremarkable without evidence of ductal dilatation or masses.   SPLEEN: The spleen is normal in size measuring 9.1 cm in maximum coronal oblique axis without focal lesions.   ADRENAL GLANDS: Bilateral adrenal glands appear normal.   KIDNEYS AND URETERS: The kidneys are normal in size and enhance symmetrically. Fluid attenuating lesion measuring 1.9 cm in superior pole of the left kidney is favored to represent a simple cyst. No hydroureteronephrosis or nephroureterolithiasis is identified.   PELVIS:   BLADDER: Symmetrical wall thickening of the urinary bladder wall with adjacent perivesicular fat stranding (series 201, image 144).   REPRODUCTIVE ORGANS: The prostate is not enlarged. Numerous brachytherapy beads are present.   BOWEL: There is wall thickening from the transverse colon to the sigmoid colon with associated hyperemia (series 201, image 89). The stomach, small and large bowel are otherwise normal in appearance without wall thickening or obstruction. The small and large bowel are normal in caliber.   The  appendix is not definitely visualized. There is however no pericecal stranding or fluid.   VESSELS: There is no aneurysmal dilatation of the abdominal aorta. The IVC appears normal. Severe atherosclerotic calcifications involving the aorta and its major branches.   PERITONEUM/RETROPERITONEUM/LYMPH NODES: Numerous enlarged abdominopelvic lymph nodes are present. Notable examples include: * 1.3 x 1.0 cm paraesophageal node (series 201, image 23) * 1.7 x 1.0 cm anterior para-aortic node (series 201, image 71) * 1.5 x 1.1 cm left para-aortic node (series 201, image 70) * 1.6 x 1.6 cm left para-aortic node (series 201, image 65)   Numerous additional prominent retroperitoneal nodes. There is mild volume hypoattenuating ascites layering within the lower pelvis and rectovesical recess. There is no loculated fluid collection, no free intraperitoneal air.   BONES AND ABDOMINAL WALL: No acute process in the visualized osseous structures. Degenerative changes are present in the thoracolumbar spine. Fat containing ventral abdominal wall hernia measuring 1.0 cm. There is diffuse skin thickening and body wall edema.       1. Indistinct wall thickening involving the transverse colon to the sigmoid colon with hyperemia is compatible with colitis. 2. Radiopaque stones with gallbladder wall thickening and pericholecystic fluid is compatible with early acute cholecystitis. If patient has ongoing right upper quadrant pain, recommend obtaining right upper quadrant ultrasound. 3. Hepatomegaly with macronodular appearance of the liver is likely secondary to developing cirrhosis, to be correlated with LFTs. Additional numerous prominent retroperitoneal nodes are likely reactive in nature. Recommend obtaining three-month follow-up scan to ensure stability. 4. Simple fluid attenuating ascites with diffuse body wall edema is compatible with volume overload. To be correlated with fluid status.   I personally reviewed the images/study and I  agree with the findings as stated. This study was interpreted at [facility], [place], [place].   MACRO: None     Dictation workstation:   [id number]    CT angio brain attack head w IV contrast and post procedure    Result Date: 10/6/2023  Interpreted By:  Wang Vasquez, STUDY: CT ANGIO BRAIN ATTACK HEAD W IV CONTRAST AND POST PROCEDURE; CT ANGIO BRAIN ATTACK NECK W IV CONTRAST AND POST PROCEDURE;  10/6/2023 2:59 pm   INDICATION: Signs/Symptoms:rule out posterior circulation stroke.   COMPARISON: None.   ACCESSION NUMBER(S): [id number]; [id number]   ORDERING CLINICIAN: INGRID BORJAS   TECHNIQUE: Following intravenous injection of contrast material, CT was acquired from the aortic arch to the vertex of the skull. Multiplanar reconstructions and 3D reconstructions were made.3D reconstructions, MIPs, Volume Rendered, or Surface Shading image were performed at a separate workstation   FINDINGS: Brain:   Noncontrast enhanced CT of the brain demonstrates a normal size ventricular system. There is no evidence of intracranial mass or extra-axial collection. There is no evidence of hemorrhage or hydrocephalus. Incidental note is made of bilateral proptosis   chest   The aortic arch and origin of great vessels are normal. The visualized mediastinum and pulmonary apices are normal.   Neck   Right carotid The common carotid artery is normal. There are minor atherosclerotic calcifications at the bifurcation without measurable luminal narrowing. The cervical and petrous portions are normal.   Left carotid The common carotid artery is normal. There are minor atherosclerotic calcifications at the bifurcation without measurable luminal narrowing. The cervical and petrous portions are normal   Vertebrals   The vertebral arteries are slightly asymmetrical with dominance of the left vertebral artery. Vertebral artery origin is are normal. Vertebrobasilar junction is normal. Basilar  artery is normal.   Soft tissue neck     There is no evidence of mass, cyst or adenopathy within the neck   Intracranial   There is no evidence of aneurysm, vascular malformation or branch occlusion.       * Minor atherosclerotic calcifications at the bifurcations without measurable luminal narrowing.   THIS EXAMINATION WAS INTERPRETED AT [facility] – [place]   Signed by: Wang Vasquez 10/6/2023 3:34 PM Dictation workstation:   [id number]    CT angio brain attack neck w IV contrast and post procedure    Result Date: 10/6/2023  Interpreted By:  Wang Vasquez, STUDY: CT ANGIO BRAIN ATTACK HEAD W IV CONTRAST AND POST PROCEDURE; CT ANGIO BRAIN ATTACK NECK W IV CONTRAST AND POST PROCEDURE;  10/6/2023 2:59 pm   INDICATION: Signs/Symptoms:rule out posterior circulation stroke.   COMPARISON: None.   ACCESSION NUMBER(S): [id number]; [id number]   ORDERING CLINICIAN: INGRID BORJAS   TECHNIQUE: Following intravenous injection of contrast material, CT was acquired from the aortic arch to the vertex of the skull. Multiplanar reconstructions and 3D reconstructions were made.3D reconstructions, MIPs, Volume Rendered, or Surface Shading image were performed at a separate workstation   FINDINGS: Brain:   Noncontrast enhanced CT of the brain demonstrates a normal size ventricular system. There is no evidence of intracranial mass or extra-axial collection. There is no evidence of hemorrhage or hydrocephalus. Incidental note is made of bilateral proptosis   chest   The aortic arch and origin of great vessels are normal. The visualized mediastinum and pulmonary apices are normal.   Neck   Right carotid The common carotid artery is normal. There are minor atherosclerotic calcifications at the bifurcation without measurable luminal narrowing. The cervical and petrous portions are normal.   Left carotid The common carotid artery is normal. There are minor atherosclerotic calcifications at the bifurcation without measurable luminal  narrowing. The cervical and petrous portions are normal   Vertebrals   The vertebral arteries are slightly asymmetrical with dominance of the left vertebral artery. Vertebral artery origin is are normal. Vertebrobasilar junction is normal. Basilar artery is normal.   Soft tissue neck     There is no evidence of mass, cyst or adenopathy within the neck   Intracranial   There is no evidence of aneurysm, vascular malformation or branch occlusion.       * Minor atherosclerotic calcifications at the bifurcations without measurable luminal narrowing.   THIS EXAMINATION [place]PRET[place] [facility] [place] [facility] [place]   Signed by: Wang Vasquez 10/6/2023 3:34 PM Dictation workstation:   [id number]    CT head wo IV contrast    Result Date: 10/6/2023  Interpreted By:  Lilian Vaughn and Tavana Shahrzad STUDY: CT HEAD WO IV CONTRAST;  10/6/2023 11:29 am   INDICATION: Signs/Symptoms:ams.   COMPARISON: None.   ACCESSION NUMBER(S): [id number]   ORDERING CLINICIAN: INGRID BORJAS   TECHNIQUE: Noncontrast axial CT scan of the head was performed. Angled reformats in brain and bone windows were generated. The images were reviewed in bone, brain, blood and soft tissue windows.   FINDINGS: CSF Spaces: The ventricles, sulci and basal cisterns are within normal limits. There is no extraaxial fluid collection.   Parenchyma: Calcification noted involving intradural vertebral arteries as well as parasellar insert carotid arteries bilaterally. The ventricular white matter hypodensities are noted, nonspecific, but likely representing chronic microangiopathic changes. The grey-white differentiation is intact. There is no mass effect or midline shift.  There is no intracranial hemorrhage.   Calvarium: The calvarium is unremarkable.   Paranasal sinuses and mastoids: Mucosal thickening noted in the inferior right maxillary sinus. Otherwise, paranasal sinuses and mastoid air cells are clear.   Nonspecific mild proptosis of bilateral eyes. The extraocular  muscles are unremarkable on this examination.       No acute intracranial pathology.   Bilateral exophthalmos. Correlation with clinical exam recommended.     I personally reviewed the images/study and I agree with the findings as stated. This study was interpreted at [place], [place].   MACRO: None   Signed by: Lilian Vaughn 10/6/2023 11:35 AM Dictation workstation:   [id number]    US ABD RIGHT UPPER QUADRANT    Result Date: 9/30/2023  * * *Final Report* * * DATE OF EXAM: Sep 30 2023  3:52PM   [facility] [place]   1032  -  US ABD RIGHT UPPER QUADRANT  / ACCESSION #  [id number] PROCEDURE REASON: RUQ abdominal pain, no prior imaging      * * * * Physician Interpretation * * * *  EXAMINATION:   RIGHT UPPER QUADRANT ULTRASOUND CLINICAL HISTORY: Right upper quadrant abdominal pain.  Vomiting. TECHNIQUE:  Sonography of the right upper quadrant  was performed.   Images were obtained and stored in a permanent archive. MQ:  [id number] COMPARISON: CT abdomen/pelvis 05/10/2016 RESULT: Pancreas: Normal sonographic appearance.    Portions obscured: Distal pancreatic body and tail Liver: Normal morphology.      Echotexture:  Normal, homogeneous.      Echogenicity:  Increased      Surface contour:  Smooth      Lesions:  None.  Increased echogenicity of the portal triads. Biliary: No intrahepatic biliary duct dilation.      CBD: 0.5 cm at the hilum.      Gallbladder: Collapsed           -Contents:  Cholelithiasis with numerous gallstones           -Wall:  The gallbladder wall is thickened measuring up to 0.5 cm.           -Other:  No pericholecystic fluid. Negative sonographic Polk's sign. Right Kidney: No hydronephrosis. Ascites: Trace, perihepatic.    IMPRESSION: Cholelithiasis with diffuse gallbladder wall thickening.  Trace perihepatic ascites but no definite pericholecystic fluid.  Negative sonographic Polk sign on exam.  Findings are indeterminate for acute cholecystitis.  Per clinical  discretion, consider HIDA scan for further evaluation. Nonspecific prominent/increased echogenicity of the portal triads. ACTIONABLE RESULT: FOLLOW-UP Acuity: Actionable Findings: Pancreas/Biliary Routing Code:  PB_1 Recommendation: NM HEPATOBILIARY W EF AND/OR RX ([id number]) Time Frame: At the discretion of the clinical team. COMMUNICATION: Results will be communicated with the ordering provider via Epic staff message or phone message by Imaging Support Services within 2 business days of report finalization. --END OF FINDING-- : [facility]   Transcribe Date/Time: Sep 30 2023  3:59P Dictated by : YOAN VARGAS MD This examination was interpreted and the report reviewed and electronically signed by: MELISSA POLO MD on Sep 30 2023  4:36PM  EST    XR CHEST 2V FRONTAL/LAT    Result Date: 9/30/2023  * * *Final Report* * * DATE OF EXAM: Sep 30 2023 11:59AM   EGX   [id number]  -  XR CHEST 2V FRONTAL/LAT  / ACCESSION #  [id number] PROCEDURE REASON: Shortness of breath      * * * * Physician Interpretation * * * *  EXAMINATION:  CHEST RADIOGRAPH (2 VIEW FRONTAL & LATERAL) CLINICAL HISTORY:  Shortness of breath MQ:  [id number] EXAM DATE/TIME:  9/30/2023 11:59 AM COMPARISON:  05/23/2016. RESULT: Lines, tubes, and devices:  Interval placement of a stent within the left subclavian vasculature. Lungs and pleura:  Low lung volumes with mildly prominent interstitial and airspace opacities, likely represents pulmonary edema.  No focal consolidation, pleural effusion, or discernible pneumothorax. Cardiomediastinal silhouette:  The cardiomediastinal silhouette is mildly enlarged, unchanged. Bones and soft tissues:  Unremarkable.    IMPRESSION: Cardiomegaly with mild pulmonary edema. : [facility]   Transcribe Date/Time: Sep 30 2023 12:04P Dictated by : YOAN VARGAS MD This examination was interpreted and the report reviewed and electronically signed by: JEFFY ESCOBEDO MD on Sep 30 2023 12:17PM  EST      Assessment/Plan    Principal Problem:    AMS (altered mental status)  Active Problems:    Altered mental status  #n/v/d  - continue supportive care and treatment  - Zofran 4 mg IV push q6h PRN n/v  - Imodium PRN diarrhea  - no episodes of emesis, nausea, or diarrhea since pt arrived to hospital    #anemia 2/2 ESRD  - Hgb 11.6 on admit  - continue home dose of Ferrous sulfate 325 mg PO BID    #ESRD on HD  #SOB  - dyspnea likely d/t fluid overload in setting of missed HD  - pt currently on 2LNC  - dialysis center: [facility]  - oliguric  - last HD Tues 10/3  - consult Nephrology in AM for HD  - continue home dose of Calcium acetate 4 caps TID with meals  - continue home dose of Calcitriol 0.25 mg PO every day  - renally dose meds  - avoid nephrotoxic agents    #HTN  - last /77  - continue to monitor BP  - continue home dose of Coreg 25 mg PO BID, Hydralazine 25 mg PO TID, Amlodipine 10 mg PO every day, and Valsartan-hydrochlorothiazide 320-12.5 mg PO every day    #HLD  - continue home dose of Zocor 20 mg PO at bedtime    #T2DM (last HgA1c 8.4%)  - hemoglobin A1c in AM  - monitor blood glucose ACHS  - continue home dose of Lantus 25 units BID  - mild SSI using Lispro while hospitalized  - hypoglycemic order set placed    #insomnia  - continue home dose of Ambien 10 mg PO at bedtime PRN  - last filled via [facility] 9/27/23 30 tabs/30 day supply          Dispo: admit under observation. Plan per above.       I spent 90 minutes in the professional and overall care of this patient.      Alison Salas, APRN-CNP

## 2023-10-07 ENCOUNTER — APPOINTMENT (OUTPATIENT)
Dept: DIALYSIS | Facility: HOSPITAL | Age: 48
DRG: 640 | End: 2023-10-07
Payer: COMMERCIAL

## 2023-10-07 LAB
ALBUMIN SERPL BCP-MCNC: 3.4 G/DL (ref 3.4–5)
ANION GAP SERPL CALC-SCNC: 18 MMOL/L (ref 10–20)
B-HCG SERPL-ACNC: <3 MIU/ML
BUN SERPL-MCNC: 35 MG/DL (ref 6–23)
CALCIUM SERPL-MCNC: 9.3 MG/DL (ref 8.6–10.6)
CHLORIDE SERPL-SCNC: 99 MMOL/L (ref 98–107)
CO2 SERPL-SCNC: 27 MMOL/L (ref 21–32)
CREAT SERPL-MCNC: 9.19 MG/DL (ref 0.5–1.05)
EST. AVERAGE GLUCOSE BLD GHB EST-MCNC: 114 MG/DL
GFR SERPL CREATININE-BSD FRML MDRD: 5 ML/MIN/1.73M*2
GLUCOSE BLD MANUAL STRIP-MCNC: 68 MG/DL (ref 74–99)
GLUCOSE BLD MANUAL STRIP-MCNC: 78 MG/DL (ref 74–99)
GLUCOSE BLD MANUAL STRIP-MCNC: 79 MG/DL (ref 74–99)
GLUCOSE SERPL-MCNC: 72 MG/DL (ref 74–99)
HBA1C MFR BLD: 5.6 %
HBV SURFACE AB SER-ACNC: 149 MIU/ML
HBV SURFACE AG SERPL QL IA: NONREACTIVE
MAGNESIUM SERPL-MCNC: 1.9 MG/DL (ref 1.6–2.4)
PHOSPHATE SERPL-MCNC: 7.1 MG/DL (ref 2.5–4.9)
POTASSIUM SERPL-SCNC: 3.7 MMOL/L (ref 3.5–5.3)
SODIUM SERPL-SCNC: 140 MMOL/L (ref 136–145)

## 2023-10-07 PROCEDURE — 87340 HEPATITIS B SURFACE AG IA: CPT | Performed by: NURSE PRACTITIONER

## 2023-10-07 PROCEDURE — 36415 COLL VENOUS BLD VENIPUNCTURE: CPT | Performed by: NURSE PRACTITIONER

## 2023-10-07 PROCEDURE — 84702 CHORIONIC GONADOTROPIN TEST: CPT | Performed by: NURSE PRACTITIONER

## 2023-10-07 PROCEDURE — 90935 HEMODIALYSIS ONE EVALUATION: CPT | Performed by: INTERNAL MEDICINE

## 2023-10-07 PROCEDURE — 8010000001 HC DIALYSIS - HEMODIALYSIS PER DAY

## 2023-10-07 PROCEDURE — 82947 ASSAY GLUCOSE BLOOD QUANT: CPT | Mod: 59

## 2023-10-07 PROCEDURE — 83735 ASSAY OF MAGNESIUM: CPT | Performed by: NURSE PRACTITIONER

## 2023-10-07 PROCEDURE — G0378 HOSPITAL OBSERVATION PER HR: HCPCS

## 2023-10-07 PROCEDURE — 2500000004 HC RX 250 GENERAL PHARMACY W/ HCPCS (ALT 636 FOR OP/ED): Mod: MUE | Performed by: NURSE PRACTITIONER

## 2023-10-07 PROCEDURE — 80069 RENAL FUNCTION PANEL: CPT | Performed by: NURSE PRACTITIONER

## 2023-10-07 PROCEDURE — 99253 IP/OBS CNSLTJ NEW/EST LOW 45: CPT | Performed by: INTERNAL MEDICINE

## 2023-10-07 PROCEDURE — 86706 HEP B SURFACE ANTIBODY: CPT | Performed by: NURSE PRACTITIONER

## 2023-10-07 PROCEDURE — 2500000001 HC RX 250 WO HCPCS SELF ADMINISTERED DRUGS (ALT 637 FOR MEDICARE OP): Performed by: NURSE PRACTITIONER

## 2023-10-07 PROCEDURE — 99232 SBSQ HOSP IP/OBS MODERATE 35: CPT | Performed by: STUDENT IN AN ORGANIZED HEALTH CARE EDUCATION/TRAINING PROGRAM

## 2023-10-07 PROCEDURE — 96372 THER/PROPH/DIAG INJ SC/IM: CPT | Performed by: NURSE PRACTITIONER

## 2023-10-07 PROCEDURE — 2500000002 HC RX 250 W HCPCS SELF ADMINISTERED DRUGS (ALT 637 FOR MEDICARE OP, ALT 636 FOR OP/ED): Performed by: NURSE PRACTITIONER

## 2023-10-07 PROCEDURE — 1100000001 HC PRIVATE ROOM DAILY

## 2023-10-07 RX ORDER — CALCIUM ACETATE 667 MG/1
2668 CAPSULE ORAL
Status: DISCONTINUED | OUTPATIENT
Start: 2023-10-07 | End: 2023-10-09 | Stop reason: HOSPADM

## 2023-10-07 RX ADMIN — CALCIUM ACETATE 2668 MG: 667 CAPSULE ORAL at 21:05

## 2023-10-07 RX ADMIN — Medication 2668 MG: at 08:00

## 2023-10-07 RX ADMIN — FUROSEMIDE 20 MG: 20 TABLET ORAL at 09:22

## 2023-10-07 RX ADMIN — CARVEDILOL 25 MG: 25 TABLET, FILM COATED ORAL at 21:06

## 2023-10-07 RX ADMIN — INSULIN GLARGINE 25 UNITS: 100 INJECTION, SOLUTION SUBCUTANEOUS at 09:15

## 2023-10-07 RX ADMIN — HYDROXYZINE HYDROCHLORIDE 25 MG: 25 TABLET, FILM COATED ORAL at 22:03

## 2023-10-07 RX ADMIN — HYDROXYZINE HYDROCHLORIDE 25 MG: 25 TABLET, FILM COATED ORAL at 09:31

## 2023-10-07 RX ADMIN — CALCITRIOL CAPSULES 0.25 MCG 0.25 MCG: 0.25 CAPSULE ORAL at 12:36

## 2023-10-07 RX ADMIN — Medication 2000 UNITS: at 08:58

## 2023-10-07 RX ADMIN — AMLODIPINE BESYLATE 10 MG: 10 TABLET ORAL at 08:56

## 2023-10-07 RX ADMIN — SIMVASTATIN 20 MG: 20 TABLET, FILM COATED ORAL at 21:06

## 2023-10-07 RX ADMIN — ROPINIROLE HYDROCHLORIDE 1 MG: 1 TABLET, FILM COATED ORAL at 21:06

## 2023-10-07 RX ADMIN — INSULIN GLARGINE 25 UNITS: 100 INJECTION, SOLUTION SUBCUTANEOUS at 21:07

## 2023-10-07 RX ADMIN — GABAPENTIN 300 MG: 300 CAPSULE ORAL at 21:06

## 2023-10-07 RX ADMIN — ASPIRIN 81 MG CHEWABLE TABLET 81 MG: 81 TABLET CHEWABLE at 08:56

## 2023-10-07 RX ADMIN — FERROUS SULFATE TAB 325 MG (65 MG ELEMENTAL FE) 65 MG OF IRON: 325 (65 FE) TAB at 08:56

## 2023-10-07 RX ADMIN — HYDRALAZINE HYDROCHLORIDE 25 MG: 25 TABLET, FILM COATED ORAL at 21:06

## 2023-10-07 RX ADMIN — HYDRALAZINE HYDROCHLORIDE 25 MG: 25 TABLET, FILM COATED ORAL at 08:58

## 2023-10-07 RX ADMIN — CARVEDILOL 25 MG: 25 TABLET, FILM COATED ORAL at 08:57

## 2023-10-07 SDOH — SOCIAL STABILITY: SOCIAL INSECURITY: ARE THERE ANY APPARENT SIGNS OF INJURIES/BEHAVIORS THAT COULD BE RELATED TO ABUSE/NEGLECT?: NO

## 2023-10-07 SDOH — SOCIAL STABILITY: SOCIAL INSECURITY: DO YOU FEEL UNSAFE GOING BACK TO THE PLACE WHERE YOU ARE LIVING?: NO

## 2023-10-07 SDOH — SOCIAL STABILITY: SOCIAL INSECURITY: DO YOU FEEL ANYONE HAS EXPLOITED OR TAKEN ADVANTAGE OF YOU FINANCIALLY OR OF YOUR PERSONAL PROPERTY?: NO

## 2023-10-07 SDOH — SOCIAL STABILITY: SOCIAL INSECURITY: HAS ANYONE EVER THREATENED TO HURT YOUR FAMILY OR YOUR PETS?: NO

## 2023-10-07 SDOH — SOCIAL STABILITY: SOCIAL INSECURITY: HAVE YOU HAD THOUGHTS OF HARMING ANYONE ELSE?: NO

## 2023-10-07 SDOH — SOCIAL STABILITY: SOCIAL INSECURITY: DOES ANYONE TRY TO KEEP YOU FROM HAVING/CONTACTING OTHER FRIENDS OR DOING THINGS OUTSIDE YOUR HOME?: NO

## 2023-10-07 SDOH — SOCIAL STABILITY: SOCIAL INSECURITY: ARE YOU OR HAVE YOU BEEN THREATENED OR ABUSED PHYSICALLY, EMOTIONALLY, OR SEXUALLY BY ANYONE?: NO

## 2023-10-07 ASSESSMENT — ACTIVITIES OF DAILY LIVING (ADL)
HEARING - RIGHT EAR: FUNCTIONAL
HEARING - LEFT EAR: FUNCTIONAL
DRESSING YOURSELF: INDEPENDENT
HEARING - LEFT EAR: FUNCTIONAL
GROOMING: INDEPENDENT
ASSISTIVE_DEVICE: WALKER
ASSISTIVE_DEVICE: WALKER
JUDGMENT_ADEQUATE_SAFELY_COMPLETE_DAILY_ACTIVITIES: NO
GROOMING: INDEPENDENT
ADEQUATE_TO_COMPLETE_ADL: YES
FEEDING YOURSELF: INDEPENDENT
JUDGMENT_ADEQUATE_SAFELY_COMPLETE_DAILY_ACTIVITIES: YES
DRESSING YOURSELF: INDEPENDENT
PATIENT'S MEMORY ADEQUATE TO SAFELY COMPLETE DAILY ACTIVITIES?: NO
TOILETING: INDEPENDENT
WALKS IN HOME: INDEPENDENT
WALKS IN HOME: INDEPENDENT
PATIENT'S MEMORY ADEQUATE TO SAFELY COMPLETE DAILY ACTIVITIES?: YES
BATHING: INDEPENDENT
TOILETING: INDEPENDENT
FEEDING YOURSELF: INDEPENDENT
ADEQUATE_TO_COMPLETE_ADL: NO
BATHING: INDEPENDENT
HEARING - RIGHT EAR: FUNCTIONAL

## 2023-10-07 ASSESSMENT — COGNITIVE AND FUNCTIONAL STATUS - GENERAL
PATIENT BASELINE BEDBOUND: NO
DAILY ACTIVITIY SCORE: 23
CLIMB 3 TO 5 STEPS WITH RAILING: A LITTLE
DRESSING REGULAR UPPER BODY CLOTHING: A LITTLE
MOBILITY SCORE: 23

## 2023-10-07 ASSESSMENT — PAIN SCALES - GENERAL
PAINLEVEL_OUTOF10: 0 - NO PAIN
PAINLEVEL_OUTOF10: 0 - NO PAIN

## 2023-10-07 ASSESSMENT — PATIENT HEALTH QUESTIONNAIRE - PHQ9
SUM OF ALL RESPONSES TO PHQ9 QUESTIONS 1 & 2: 0
1. LITTLE INTEREST OR PLEASURE IN DOING THINGS: NOT AT ALL
2. FEELING DOWN, DEPRESSED OR HOPELESS: NOT AT ALL

## 2023-10-07 ASSESSMENT — LIFESTYLE VARIABLES
PRESCIPTION_ABUSE_PAST_12_MONTHS: NO
SUBSTANCE_ABUSE_PAST_12_MONTHS: NO
SKIP TO QUESTIONS 9-10: 0
AUDIT-C TOTAL SCORE: -1
HOW OFTEN DO YOU HAVE 6 OR MORE DRINKS ON ONE OCCASION: LESS THAN MONTHLY
AUDIT-C TOTAL SCORE: -1
HOW MANY STANDARD DRINKS CONTAINING ALCOHOL DO YOU HAVE ON A TYPICAL DAY: PATIENT DECLINED
HOW OFTEN DO YOU HAVE A DRINK CONTAINING ALCOHOL: MONTHLY OR LESS

## 2023-10-07 ASSESSMENT — PAIN - FUNCTIONAL ASSESSMENT: PAIN_FUNCTIONAL_ASSESSMENT: NO/DENIES PAIN

## 2023-10-07 NOTE — PROGRESS NOTES
"CLINICAL EVENT NOTE - DIALYSIS NOTE    Rissa Bhakta is a 47 y.o. female on day 1 of admission presenting with AMS (altered mental status). Nephrology consulting for dialysis care.    Seen during hemodialysis, undergoing treatment per submitted orders:   2 K,  2.5 Ca,   4 hours. Fluid removal 1 liter (to dry wt) as tolerated (keep SBP> 90mmHg).         Vitals 24HR  Heart Rate:  [69-89]   Temp:  [36.2 °C (97.2 °F)-37.2 °C (99 °F)]   Resp:  [15-18]   BP: (125-165)/(63-87)   Height:  [162.6 cm (5' 4.02\")]   Weight:  [97.5 kg (214 lb 15.2 oz)]   SpO2:  [95 %-100 %]         Results from last 72 hours   Lab Units 10/07/23  1120 10/06/23  1310 10/06/23  0900   SODIUM mmol/L 140  --  137   POTASSIUM mmol/L 3.7  --  4.9   CO2 mmol/L 27  --  26   BUN mg/dL 35*  --  29*   CREATININE mg/dL 9.19*   < > 7.62*   PHOSPHORUS mg/dL 7.1*  --   --    CALCIUM mg/dL 9.3  --  9.5   ALBUMIN g/dL 3.4  --  3.9   GLUCOSE mg/dL 72*  --  172*   WBC AUTO x10*3/uL  --   --  3.6*    < > = values in this interval not displayed.      Results from last 7 days   Lab Units 10/06/23  0900   WBC AUTO x10*3/uL 3.6*   RBC AUTO x10*6/uL 4.21   HEMOGLOBIN g/dL 11.6*   HEMATOCRIT % 37.9          "

## 2023-10-07 NOTE — NURSING NOTE
Report from Sending RN:    Report From: MANI Spann  Recent Surgery of Procedure: No  Baseline Level of Consciousness (LOC): x4  Oxygen Use: yes, 2L  Type: nasal cannula  Diabetic: No  Last BP Med Given Day of Dialysis:   Last Pain Med Given:   Lab Tests to be Obtained with Dialysis: No  Blood Transfusion to be Given During Dialysis: No  Available IV Access: Yes  Medications to be Administered During Dialysis: No  Continuous IV Infusion Running: No  Restraints on Currently or in the Last 24 Hours: No  Hand-Off Communication:   Patient come in bed

## 2023-10-07 NOTE — NURSING NOTE
..Report to Receiving RN:    Report From: MANI Dewitt  Time Report Called: 1950  Hand-Off Communication: Pt treatment went well. Vital signs stable, fluid removal 1L.  Complications During Treatment: No  Ultrafiltration Treatment: No  Medications Administered During Dialysis: No  Blood Products Administered During Dialysis: No  Labs Sent During Dialysis: No  Heparin Drip Rate Changes: No        Last Updated: 7:50 PM by JOVI YOUNG

## 2023-10-07 NOTE — PROGRESS NOTES
"Rissa Bhakta is a 47 y.o. female on hospital day 1 of admission presenting with AMS (altered mental status).    Subjective     Patient somewhat lethargic this morning but answers appropriately. Nephro consulted, will go for HD today.       Objective     GENERAL APPEARANCE: A&Ox3, appears lethargic  HEAD: normocephalic, atraumatic  THROAT: Oral cavity and pharynx normal. No inflammation, swelling, exudate, or lesions.  NECK: Neck supple, non-tender without lymphadenopathy, masses or thyromegaly.  CARDIAC: Normal S1 and S2. No S3, S4 or murmurs. Rhythm is regular. There is no peripheral edema, cyanosis or pallor. Extremities are warm and well perfused. No carotid bruits.  LUNGS: Clear to auscultation bilaterally without rales, rhonchi, wheezing or diminished breath sounds.  ABDOMEN: Positive bowel sounds. Soft, nondistended, nontender. No guarding or rebound. No masses.  EXTREMITIES: No significant deformity or joint abnormality. No edema. Peripheral pulses intact. No varicosities.  SKIN: Skin normal color, texture and turgor with no lesions or rash  PSYCHIATRIC: oriented to person, place, and time, good judgement and reason, without hallucinations, abnormal affect or abnormal behaviors during the examination. Patient is not suicidal.      Last Recorded Vitals  Blood pressure 139/74, pulse 70, temperature 36.2 °C (97.2 °F), temperature source Temporal, resp. rate 15, height 1.626 m (5' 4.02\"), weight 97.5 kg (214 lb 15.2 oz), SpO2 98 %.  Intake/Output last 3 Shifts:  No intake/output data recorded.    Relevant Results  Lab Results   Component Value Date    WBC 3.6 (L) 10/06/2023    HGB 11.6 (L) 10/06/2023    HCT 37.9 10/06/2023    MCV 90 10/06/2023     (L) 10/06/2023      Lab Results   Component Value Date    GLUCOSE 72 (L) 10/07/2023    CALCIUM 9.3 10/07/2023     10/07/2023    K 3.7 10/07/2023    CO2 27 10/07/2023    CL 99 10/07/2023    BUN 35 (H) 10/07/2023    CREATININE 9.19 (H) 10/07/2023 "     Scheduled medications  amLODIPine, 10 mg, oral, Daily  aspirin, 81 mg, oral, Daily  calcitriol, 0.25 mcg, oral, Daily  calcium acetate, 2,668 mg, oral, TID with meals  carvedilol, 25 mg, oral, BID  cholecalciferol, 2,000 Units, oral, Daily  ferrous sulfate, 65 mg of iron, oral, BID  furosemide, 20 mg, oral, Daily  gabapentin, 300 mg, oral, Nightly  hydrALAZINE, 25 mg, oral, TID  hydroCHLOROthiazide, 12.5 mg, oral, Daily  insulin glargine, 25 Units, subcutaneous, BID  insulin lispro, 0-10 Units, subcutaneous, TID with meals  rOPINIRole, 1 mg, oral, Nightly  simvastatin, 20 mg, oral, Nightly  valsartan, 320 mg, oral, Daily      Continuous medications     PRN medications  PRN medications: dextrose 10 % in water (D10W), diclofenac sodium, glucagon, hydrOXYzine HCL, loperamide, ondansetron, oxyCODONE-acetaminophen, zolpidem    Assessment/Plan       Nausea, vomiting, and diarrhea  - continue supportive care and treatment  - Zofran 4 mg IV push q6h PRN n/v  - Imodium PRN diarrhea  - no episodes of emesis, nausea, or diarrhea since pt arrived to hospital     anemia 2/2 ESRD  - Hgb 11.6 on admit  - continue home dose of Ferrous sulfate 325 mg PO BID     ESRD on HD  SOB  - dyspnea likely d/t fluid overload in setting of missed HD  - pt currently on 2LNC  - dialysis center: [facility]  - oliguric  - last HD Tues 10/3  - consult Nephrology in AM for HD  - continue home dose of Calcium acetate 4 caps TID with meals  - continue home dose of Calcitriol 0.25 mg PO every day  - renally dose meds  - avoid nephrotoxic agents  - Nephrology consulted, will go for HD today     HTN  - last /77  - continue to monitor BP  - continue home dose of Coreg 25 mg PO BID, Hydralazine 25 mg PO TID, Amlodipine 10 mg PO every day, and Valsartan-hydrochlorothiazide 320-12.5 mg PO every day     HLD  - continue home dose of Zocor 20 mg PO at bedtime     T2DM (last HgA1c 8.4%)  - hemoglobin A1c in AM  - monitor blood glucose ACHS  - continue home  dose of Lantus 25 units BID  - mild SSI using Lispro while hospitalized  - hypoglycemic order set placed     insomnia  - continue home dose of Ambien 10 mg PO at bedtime PRN  - last filled via [facility] 9/27/23 30 tabs/30 day supply      Code status: FULL CODE  Dispo: HD, supportive care for N/V/D    Jerel Rodriguez MD     The patient encounter includes all but not limited to; Evaluation of laboratory results, pertinent imaging, and vital signs. Daily updates are discussed with any consulting services and family/medical power of  as needed. The patient's discharge  process begins at admission and daily contact with the patient's TCC and SW is pertinent in their efficient and safe discharge.

## 2023-10-08 LAB
ALBUMIN SERPL BCP-MCNC: 3.5 G/DL (ref 3.4–5)
ANION GAP SERPL CALC-SCNC: 15 MMOL/L (ref 10–20)
BUN SERPL-MCNC: 22 MG/DL (ref 6–23)
CALCIUM SERPL-MCNC: 10.1 MG/DL (ref 8.6–10.6)
CHLORIDE SERPL-SCNC: 100 MMOL/L (ref 98–107)
CO2 SERPL-SCNC: 29 MMOL/L (ref 21–32)
CREAT SERPL-MCNC: 5.83 MG/DL (ref 0.5–1.05)
ERYTHROCYTE [DISTWIDTH] IN BLOOD BY AUTOMATED COUNT: 18.8 % (ref 11.5–14.5)
GFR SERPL CREATININE-BSD FRML MDRD: 8 ML/MIN/1.73M*2
GLUCOSE BLD MANUAL STRIP-MCNC: 125 MG/DL (ref 74–99)
GLUCOSE BLD MANUAL STRIP-MCNC: 134 MG/DL (ref 74–99)
GLUCOSE BLD MANUAL STRIP-MCNC: 46 MG/DL (ref 74–99)
GLUCOSE BLD MANUAL STRIP-MCNC: 60 MG/DL (ref 74–99)
GLUCOSE BLD MANUAL STRIP-MCNC: 82 MG/DL (ref 74–99)
GLUCOSE BLD MANUAL STRIP-MCNC: 97 MG/DL (ref 74–99)
GLUCOSE SERPL-MCNC: 39 MG/DL (ref 74–99)
HCT VFR BLD AUTO: 39.5 % (ref 36–46)
HGB BLD-MCNC: 11.7 G/DL (ref 12–16)
MCH RBC QN AUTO: 27.3 PG (ref 26–34)
MCHC RBC AUTO-ENTMCNC: 29.6 G/DL (ref 32–36)
MCV RBC AUTO: 92 FL (ref 80–100)
NRBC BLD-RTO: 0 /100 WBCS (ref 0–0)
PHOSPHATE SERPL-MCNC: 4.4 MG/DL (ref 2.5–4.9)
PLATELET # BLD AUTO: 106 X10*3/UL (ref 150–450)
PMV BLD AUTO: 12.9 FL (ref 7.5–11.5)
POTASSIUM SERPL-SCNC: 3.8 MMOL/L (ref 3.5–5.3)
RBC # BLD AUTO: 4.29 X10*6/UL (ref 4–5.2)
SODIUM SERPL-SCNC: 140 MMOL/L (ref 136–145)
WBC # BLD AUTO: 3.7 X10*3/UL (ref 4.4–11.3)

## 2023-10-08 PROCEDURE — 2500000002 HC RX 250 W HCPCS SELF ADMINISTERED DRUGS (ALT 637 FOR MEDICARE OP, ALT 636 FOR OP/ED): Performed by: STUDENT IN AN ORGANIZED HEALTH CARE EDUCATION/TRAINING PROGRAM

## 2023-10-08 PROCEDURE — 2500000001 HC RX 250 WO HCPCS SELF ADMINISTERED DRUGS (ALT 637 FOR MEDICARE OP): Performed by: NURSE PRACTITIONER

## 2023-10-08 PROCEDURE — 36415 COLL VENOUS BLD VENIPUNCTURE: CPT | Performed by: STUDENT IN AN ORGANIZED HEALTH CARE EDUCATION/TRAINING PROGRAM

## 2023-10-08 PROCEDURE — 1100000001 HC PRIVATE ROOM DAILY

## 2023-10-08 PROCEDURE — 80069 RENAL FUNCTION PANEL: CPT | Performed by: STUDENT IN AN ORGANIZED HEALTH CARE EDUCATION/TRAINING PROGRAM

## 2023-10-08 PROCEDURE — 2500000004 HC RX 250 GENERAL PHARMACY W/ HCPCS (ALT 636 FOR OP/ED): Performed by: NURSE PRACTITIONER

## 2023-10-08 PROCEDURE — 85027 COMPLETE CBC AUTOMATED: CPT | Performed by: STUDENT IN AN ORGANIZED HEALTH CARE EDUCATION/TRAINING PROGRAM

## 2023-10-08 PROCEDURE — 82947 ASSAY GLUCOSE BLOOD QUANT: CPT | Mod: 59

## 2023-10-08 PROCEDURE — 2500000001 HC RX 250 WO HCPCS SELF ADMINISTERED DRUGS (ALT 637 FOR MEDICARE OP): Performed by: STUDENT IN AN ORGANIZED HEALTH CARE EDUCATION/TRAINING PROGRAM

## 2023-10-08 PROCEDURE — G0378 HOSPITAL OBSERVATION PER HR: HCPCS

## 2023-10-08 PROCEDURE — 96372 THER/PROPH/DIAG INJ SC/IM: CPT | Performed by: STUDENT IN AN ORGANIZED HEALTH CARE EDUCATION/TRAINING PROGRAM

## 2023-10-08 PROCEDURE — 99233 SBSQ HOSP IP/OBS HIGH 50: CPT | Performed by: STUDENT IN AN ORGANIZED HEALTH CARE EDUCATION/TRAINING PROGRAM

## 2023-10-08 RX ORDER — INSULIN GLARGINE 100 [IU]/ML
20 INJECTION, SOLUTION SUBCUTANEOUS 2 TIMES DAILY
Status: DISCONTINUED | OUTPATIENT
Start: 2023-10-08 | End: 2023-10-08

## 2023-10-08 RX ORDER — INSULIN GLARGINE 100 [IU]/ML
10 INJECTION, SOLUTION SUBCUTANEOUS 2 TIMES DAILY
Status: DISCONTINUED | OUTPATIENT
Start: 2023-10-08 | End: 2023-10-09 | Stop reason: HOSPADM

## 2023-10-08 RX ADMIN — FUROSEMIDE 20 MG: 20 TABLET ORAL at 09:15

## 2023-10-08 RX ADMIN — CALCIUM ACETATE 2668 MG: 667 CAPSULE ORAL at 12:31

## 2023-10-08 RX ADMIN — CALCIUM ACETATE 2668 MG: 667 CAPSULE ORAL at 09:15

## 2023-10-08 RX ADMIN — ASPIRIN 81 MG CHEWABLE TABLET 81 MG: 81 TABLET CHEWABLE at 09:15

## 2023-10-08 RX ADMIN — GABAPENTIN 300 MG: 300 CAPSULE ORAL at 21:18

## 2023-10-08 RX ADMIN — INSULIN GLARGINE 10 UNITS: 100 INJECTION, SOLUTION SUBCUTANEOUS at 21:18

## 2023-10-08 RX ADMIN — CALCITRIOL CAPSULES 0.25 MCG 0.25 MCG: 0.25 CAPSULE ORAL at 11:16

## 2023-10-08 RX ADMIN — Medication 2000 UNITS: at 09:15

## 2023-10-08 RX ADMIN — ROPINIROLE HYDROCHLORIDE 1 MG: 1 TABLET, FILM COATED ORAL at 21:32

## 2023-10-08 RX ADMIN — HYDRALAZINE HYDROCHLORIDE 25 MG: 25 TABLET, FILM COATED ORAL at 15:01

## 2023-10-08 RX ADMIN — HYDRALAZINE HYDROCHLORIDE 25 MG: 25 TABLET, FILM COATED ORAL at 09:15

## 2023-10-08 RX ADMIN — SIMVASTATIN 20 MG: 20 TABLET, FILM COATED ORAL at 21:18

## 2023-10-08 RX ADMIN — CARVEDILOL 25 MG: 25 TABLET, FILM COATED ORAL at 21:18

## 2023-10-08 RX ADMIN — VALSARTAN 320 MG: 320 TABLET, FILM COATED ORAL at 09:15

## 2023-10-08 RX ADMIN — CARVEDILOL 25 MG: 25 TABLET, FILM COATED ORAL at 09:15

## 2023-10-08 RX ADMIN — CALCIUM ACETATE 2668 MG: 667 CAPSULE ORAL at 17:21

## 2023-10-08 RX ADMIN — AMLODIPINE BESYLATE 10 MG: 10 TABLET ORAL at 09:14

## 2023-10-08 RX ADMIN — HYDRALAZINE HYDROCHLORIDE 25 MG: 25 TABLET, FILM COATED ORAL at 21:18

## 2023-10-08 RX ADMIN — Medication 1 APPLICATION: at 15:01

## 2023-10-08 RX ADMIN — Medication 1 APPLICATION: at 09:18

## 2023-10-08 RX ADMIN — HYDROXYZINE HYDROCHLORIDE 25 MG: 25 TABLET, FILM COATED ORAL at 21:18

## 2023-10-08 ASSESSMENT — PAIN SCALES - GENERAL: PAINLEVEL_OUTOF10: 0 - NO PAIN

## 2023-10-08 NOTE — CONSULTS
"Reason For Consult  ESRD care    History Of Present Illness  Rissa Bhakta is a 47 y.o. female admitted via ED on 10/6 with altered mental status and missed dialysis. Nephrology asked to follow for dialysis care.  Patient with ESRD, on dialysis TTS at [facility] via [facility], Dr. ZANDRA Carlin nephrologist. Last outpatient dialysis 10/3  . Post HD wt 96.9, EDW 97 kg.    Per chart review: \"Rissa Bhakta is a 47 y.o. female who presented to the ED  for further evaluation of the following complaints: increased sleepiness, n/v/d, SOB, and missed HD yesterday (10/5). Pt reports that she missed HD yesterday because she was sleepy and moving slow which caused her to miss her dialysis transportation. Pt reports that nausea and vomiting started this morning and she had one episode of emesis prior to ED presentation. Diarrhea has been present on and off for months per report of pt. She denies any recent changes in her diet. Pt was somnolent on arrival and kept falling asleep during pt interview with ED provider. Pt denies any recent sick contacts or travel. ED provider was able to speak with pt's PCP (Dr. Rodriguez) who reports that pt has been complaining of full body pruritus which he has been trying to manage. PCP of pt does not think pruritus is r/t uremic cause because pt consistently gets dialysis. He reports that pt has been taking Hydroxine, OTC Benadryl, Ambien, and Gabapentin outpatient and her somnolence may be r/t polypharmacy.\"     Past Medical History   Anemia associated with chronic renal failure      Diabetes mellitus (CMS/[facility])      ESRD (end stage renal disease) on dialysis (CMS/[facility]ly [facility])      Hyperlipidemia      Hypertension      Insomnia      POTS (postural orthostatic tachycardia syndrome)      Restless leg syndrome      Surgical History  Hemodialysis AVF placement     Social History  Non-smoker, past alcohol use, no illicits    Family History  Family History   Problem Relation Name Age of Onset    No Known " "Problems Mother      No Known Problems Father      No Known Problems Sister      No Known Problems Brother      No Known Problems Daughter      No Known Problems Son          Allergies  Morphine hcl    Review of Systems  Constitutional:  Positive for fatigue. Negative for appetite change, chills, diaphoresis and fever.   HENT:  Negative for congestion, hearing loss, sore throat and trouble swallowing.    Eyes: Negative.    Respiratory:  Positive for shortness of breath. Negative for cough and wheezing.    Cardiovascular:  Negative for chest pain and palpitations.   Gastrointestinal:  Positive for diarrhea, nausea and vomiting. Negative for abdominal pain, blood in stool and constipation.   Endocrine: Negative for polydipsia, polyphagia and polyuria.   Genitourinary:  Negative for difficulty urinating, dysuria, flank pain and frequency.   Musculoskeletal: Negative.    Skin: Negative.    Neurological:  Negative for dizziness, seizures, syncope, weakness and numbness.   Psychiatric/Behavioral: Negative.        Physical Exam  General: NAD  HEENT: NC/AT, clear sclerae, MMM  Lungs: CTAB  Heart RRR, no M,R,G  GI: abdomen benign  Extr - no edema  Skin: pale, warm and dry  ACCESS; [facility]     Vitals 24HR  Heart Rate:  [67-76]   Temp:  [36.2 °C (97.2 °F)-37.2 °C (99 °F)]   Resp:  [15-18]   BP: (129-158)/(63-87)   Height:  [162.6 cm (5' 4.02\")]   Weight:  [97.5 kg (214 lb 15.2 oz)]   SpO2:  [95 %-100 %]     Scheduled medications  amLODIPine, 10 mg, oral, Daily  aspirin, 81 mg, oral, Daily  calcitriol, 0.25 mcg, oral, Daily  calcium acetate, 2,668 mg, oral, TID with meals  carvedilol, 25 mg, oral, BID  cholecalciferol, 2,000 Units, oral, Daily  ferrous sulfate, 65 mg of iron, oral, BID  furosemide, 20 mg, oral, Daily  gabapentin, 300 mg, oral, Nightly  hydrALAZINE, 25 mg, oral, TID  hydroCHLOROthiazide, 12.5 mg, oral, Daily  insulin glargine, 25 Units, subcutaneous, BID  insulin lispro, 0-10 Units, subcutaneous, TID with " meals  rOPINIRole, 1 mg, oral, Nightly  simvastatin, 20 mg, oral, Nightly  valsartan, 320 mg, oral, Daily      Continuous medications     PRN medications  PRN medications: dextrose 10 % in water (D10W), diclofenac sodium, glucagon, hydrOXYzine HCL, loperamide, ondansetron, zolpidem     Relevant Results  Results for orders placed or performed during the hospital encounter of 10/06/23 (from the past 24 hour(s))   ECG 12 lead   Result Value Ref Range    Ventricular Rate 67 BPM    Atrial Rate 67 BPM    ME Interval 184 ms    QRS Duration 98 ms    QT Interval 466 ms    QTC Calculation(Bazett) 492 ms    P Axis 56 degrees    R Axis 30 degrees    T Axis 62 degrees    QRS Count 11 beats    Q Onset 210 ms    P Onset 118 ms    P Offset 185 ms    T Offset 443 ms    QTC Fredericia 483 ms   POCT GLUCOSE   Result Value Ref Range    POCT Glucose 79 74 - 99 mg/dL   hCG, quantitative, pregnancy   Result Value Ref Range    HCG, Beta-Quantitative <3 <5 mIU/mL   Renal Function Panel   Result Value Ref Range    Glucose 72 (L) 74 - 99 mg/dL    Sodium 140 136 - 145 mmol/L    Potassium 3.7 3.5 - 5.3 mmol/L    Chloride 99 98 - 107 mmol/L    Bicarbonate 27 21 - 32 mmol/L    Anion Gap 18 10 - 20 mmol/L    Urea Nitrogen 35 (H) 6 - 23 mg/dL    Creatinine 9.19 (H) 0.50 - 1.05 mg/dL    eGFR 5 (L) >60 mL/min/1.73m*2    Calcium 9.3 8.6 - 10.6 mg/dL    Phosphorus 7.1 (H) 2.5 - 4.9 mg/dL    Albumin 3.4 3.4 - 5.0 g/dL   Magnesium   Result Value Ref Range    Magnesium 1.90 1.60 - 2.40 mg/dL   Hepatitis B surface antigen   Result Value Ref Range    Hepatitis B Surface AG Nonreactive Nonreactive   Hepatitis B surface antibody   Result Value Ref Range    Hepatitis B Surface .0 (H) <10.0 mIU/mL   POCT GLUCOSE   Result Value Ref Range    POCT Glucose 78 74 - 99 mg/dL   POCT GLUCOSE   Result Value Ref Range    POCT Glucose 68 (L) 74 - 99 mg/dL         Assessment/Plan   46 yo with ESRD, on dialysis TTS at [facility], Dr. ZANDRA Carlin nephrologist.  Last outpatient dialysis 10/3.  Post HD wt 96.9, EDW 97 kg.    RECOMMENDATIONS/PLAN:  - dialysis today per submitted orders  - resume TTS dialysis   - please discontinue FeSO4 and hydrochlorothiazide  - start renal MVI 1 tab po daily  We will follow for dialysis needs. Thank you.         Savita Bergeron MD

## 2023-10-08 NOTE — PROGRESS NOTES
"Rissa Bhakta is a 47 y.o. female on hospital day 2 of admission presenting with AMS (altered mental status).    Subjective     Patient still lethargic and weak, however, nausea an vomiting resolved.       Objective     GENERAL APPEARANCE: A&Ox3, appears in no acute distress  HEAD: normocephalic, atraumatic  THROAT: Oral cavity and pharynx normal. No inflammation, swelling, exudate, or lesions.  NECK: Neck supple, non-tender without lymphadenopathy, masses or thyromegaly.  CARDIAC: Normal S1 and S2. No S3, S4 or murmurs. Rhythm is regular. There is no peripheral edema, cyanosis or pallor. Extremities are warm and well perfused. No carotid bruits.  LUNGS: Clear to auscultation bilaterally without rales, rhonchi, wheezing or diminished breath sounds.  ABDOMEN: Positive bowel sounds. Soft, nondistended, nontender. No guarding or rebound. No masses.  EXTREMITIES: No significant deformity or joint abnormality. No edema. Peripheral pulses intact. No varicosities.  SKIN: Skin normal color, texture and turgor with no lesions or rash  PSYCHIATRIC: oriented to person, place, and time, good judgement and reason, without hallucinations, abnormal affect or abnormal behaviors during the examination. Patient is not suicidal.        Last Recorded Vitals  Blood pressure 158/80, pulse 69, temperature 37 °C (98.6 °F), resp. rate 16, height 1.626 m (5' 4.02\"), weight 97.5 kg (214 lb 15.2 oz), SpO2 99 %.  Intake/Output last 3 Shifts:  I/O last 3 completed shifts:  In: 2040 (20.9 mL/kg) [P.O.:240; I.V.:1000 (10.3 mL/kg); Other:800]  Out: 4206 (43.1 mL/kg) [Other:4206]  Weight: 97.5 kg     Relevant Results  Lab Results   Component Value Date    WBC 3.7 (L) 10/08/2023    HGB 11.7 (L) 10/08/2023    HCT 39.5 10/08/2023    MCV 92 10/08/2023     (L) 10/08/2023      Lab Results   Component Value Date    GLUCOSE 39 (LL) 10/08/2023    CALCIUM 10.1 10/08/2023     10/08/2023    K 3.8 10/08/2023    CO2 29 10/08/2023     " 10/08/2023    BUN 22 10/08/2023    CREATININE 5.83 (H) 10/08/2023     Scheduled medications  amLODIPine, 10 mg, oral, Daily  aspirin, 81 mg, oral, Daily  calcitriol, 0.25 mcg, oral, Daily  calcium acetate, 2,668 mg, oral, TID with meals  carvedilol, 25 mg, oral, BID  cholecalciferol, 2,000 Units, oral, Daily  emollient combination no.92, 1 Application, Topical, TID  furosemide, 20 mg, oral, Daily  gabapentin, 300 mg, oral, Nightly  hydrALAZINE, 25 mg, oral, TID  insulin glargine, 25 Units, subcutaneous, BID  insulin lispro, 0-10 Units, subcutaneous, TID with meals  rOPINIRole, 1 mg, oral, Nightly  simvastatin, 20 mg, oral, Nightly  valsartan, 320 mg, oral, Daily      Continuous medications     PRN medications  PRN medications: dextrose 10 % in water (D10W), diclofenac sodium, glucagon, hydrOXYzine HCL, loperamide, ondansetron, zolpidem    Assessment/Plan     Nausea, vomiting, and diarrhea  - continue supportive care and treatment  - Zofran 4 mg IV push q6h PRN n/v  - Imodium PRN diarrhea  - no episodes of emesis, nausea, or diarrhea since pt arrived to hospital  - Patient weak on exam, will have PT/OT see the patient     anemia 2/2 ESRD  - Hgb 11.6 on admit  - continue home dose of Ferrous sulfate 325 mg PO BID     ESRD on HD  SOB  - dyspnea likely d/t fluid overload in setting of missed HD  - pt currently on 2LNC  - dialysis center: [facility]  - oliguric  - last HD Tues 10/3  - consult Nephrology in AM for HD  - continue home dose of Calcium acetate 4 caps TID with meals  - continue home dose of Calcitriol 0.25 mg PO every day  - renally dose meds  - avoid nephrotoxic agents  - Nephrology consulted, last HD on 10/7     HTN  - last /77  - continue to monitor BP  - continue home dose of Coreg 25 mg PO BID, Hydralazine 25 mg PO TID, Amlodipine 10 mg PO every day, and Valsartan-hydrochlorothiazide 320-12.5 mg PO every day     HLD  - continue home dose of Zocor 20 mg PO at bedtime     T2DM (last HgA1c 8.4%)  -  hemoglobin A1c in AM  - monitor blood glucose ACHS  - continue home dose of Lantus 25 units BID  - mild SSI using Lispro while hospitalized  - hypoglycemic order set placed  - Hypoglycemic this morning, morning lantus held, will adjust dose to account for diet while in the hospital         Code status: FULL CODE  Dispo: HD, supportive care for N/V/D     Jerel Rodriguez MD     The patient encounter includes all but not limited to; Evaluation of laboratory results, pertinent imaging, and vital signs. Daily updates are discussed with any consulting services and family/medical power of  as needed. The patient's discharge  process begins at admission and daily contact with the patient's TCC and SW is pertinent in their efficient and safe discharge.

## 2023-10-09 VITALS
OXYGEN SATURATION: 96 % | HEART RATE: 72 BPM | TEMPERATURE: 98.4 F | HEIGHT: 64 IN | DIASTOLIC BLOOD PRESSURE: 71 MMHG | SYSTOLIC BLOOD PRESSURE: 152 MMHG | BODY MASS INDEX: 36.7 KG/M2 | WEIGHT: 214.95 LBS | RESPIRATION RATE: 18 BRPM

## 2023-10-09 LAB
ALBUMIN SERPL BCP-MCNC: 3.5 G/DL (ref 3.4–5)
ALP SERPL-CCNC: 43 U/L (ref 33–110)
ALT SERPL W P-5'-P-CCNC: 7 U/L (ref 7–45)
ANION GAP SERPL CALC-SCNC: 14 MMOL/L (ref 10–20)
AST SERPL W P-5'-P-CCNC: 9 U/L (ref 9–39)
BILIRUB SERPL-MCNC: 1 MG/DL (ref 0–1.2)
BUN SERPL-MCNC: 30 MG/DL (ref 6–23)
CALCIUM SERPL-MCNC: 10.9 MG/DL (ref 8.6–10.6)
CHLORIDE SERPL-SCNC: 97 MMOL/L (ref 98–107)
CO2 SERPL-SCNC: 28 MMOL/L (ref 21–32)
CREAT SERPL-MCNC: 6.5 MG/DL (ref 0.5–1.05)
ERYTHROCYTE [DISTWIDTH] IN BLOOD BY AUTOMATED COUNT: 18.3 % (ref 11.5–14.5)
GFR SERPL CREATININE-BSD FRML MDRD: 7 ML/MIN/1.73M*2
GLUCOSE BLD MANUAL STRIP-MCNC: 126 MG/DL (ref 74–99)
GLUCOSE BLD MANUAL STRIP-MCNC: 129 MG/DL (ref 74–99)
GLUCOSE BLD MANUAL STRIP-MCNC: 144 MG/DL (ref 74–99)
GLUCOSE SERPL-MCNC: 129 MG/DL (ref 74–99)
HCT VFR BLD AUTO: 36.2 % (ref 36–46)
HGB BLD-MCNC: 11.1 G/DL (ref 12–16)
MCH RBC QN AUTO: 27.1 PG (ref 26–34)
MCHC RBC AUTO-ENTMCNC: 30.7 G/DL (ref 32–36)
MCV RBC AUTO: 88 FL (ref 80–100)
NRBC BLD-RTO: 0 /100 WBCS (ref 0–0)
PLATELET # BLD AUTO: 78 X10*3/UL (ref 150–450)
PMV BLD AUTO: ABNORMAL FL
POTASSIUM SERPL-SCNC: 4 MMOL/L (ref 3.5–5.3)
PROT SERPL-MCNC: 7.2 G/DL (ref 6.4–8.2)
RBC # BLD AUTO: 4.1 X10*6/UL (ref 4–5.2)
SODIUM SERPL-SCNC: 135 MMOL/L (ref 136–145)
WBC # BLD AUTO: 3.7 X10*3/UL (ref 4.4–11.3)

## 2023-10-09 PROCEDURE — 82947 ASSAY GLUCOSE BLOOD QUANT: CPT | Mod: 59

## 2023-10-09 PROCEDURE — G0378 HOSPITAL OBSERVATION PER HR: HCPCS

## 2023-10-09 PROCEDURE — 99232 SBSQ HOSP IP/OBS MODERATE 35: CPT | Performed by: NURSE PRACTITIONER

## 2023-10-09 PROCEDURE — 97162 PT EVAL MOD COMPLEX 30 MIN: CPT | Mod: GP | Performed by: PHYSICAL THERAPIST

## 2023-10-09 PROCEDURE — 85027 COMPLETE CBC AUTOMATED: CPT | Performed by: STUDENT IN AN ORGANIZED HEALTH CARE EDUCATION/TRAINING PROGRAM

## 2023-10-09 PROCEDURE — 2500000002 HC RX 250 W HCPCS SELF ADMINISTERED DRUGS (ALT 637 FOR MEDICARE OP, ALT 636 FOR OP/ED): Performed by: STUDENT IN AN ORGANIZED HEALTH CARE EDUCATION/TRAINING PROGRAM

## 2023-10-09 PROCEDURE — 84075 ASSAY ALKALINE PHOSPHATASE: CPT | Performed by: STUDENT IN AN ORGANIZED HEALTH CARE EDUCATION/TRAINING PROGRAM

## 2023-10-09 PROCEDURE — 36415 COLL VENOUS BLD VENIPUNCTURE: CPT | Performed by: STUDENT IN AN ORGANIZED HEALTH CARE EDUCATION/TRAINING PROGRAM

## 2023-10-09 PROCEDURE — 2500000001 HC RX 250 WO HCPCS SELF ADMINISTERED DRUGS (ALT 637 FOR MEDICARE OP): Performed by: STUDENT IN AN ORGANIZED HEALTH CARE EDUCATION/TRAINING PROGRAM

## 2023-10-09 PROCEDURE — 97535 SELF CARE MNGMENT TRAINING: CPT | Mod: GO | Performed by: OCCUPATIONAL THERAPIST

## 2023-10-09 PROCEDURE — 97165 OT EVAL LOW COMPLEX 30 MIN: CPT | Mod: GO | Performed by: OCCUPATIONAL THERAPIST

## 2023-10-09 PROCEDURE — 96372 THER/PROPH/DIAG INJ SC/IM: CPT | Performed by: STUDENT IN AN ORGANIZED HEALTH CARE EDUCATION/TRAINING PROGRAM

## 2023-10-09 PROCEDURE — 2500000001 HC RX 250 WO HCPCS SELF ADMINISTERED DRUGS (ALT 637 FOR MEDICARE OP): Performed by: NURSE PRACTITIONER

## 2023-10-09 PROCEDURE — 99239 HOSP IP/OBS DSCHRG MGMT >30: CPT | Performed by: INTERNAL MEDICINE

## 2023-10-09 RX ORDER — INSULIN GLARGINE 100 [IU]/ML
10 INJECTION, SOLUTION SUBCUTANEOUS NIGHTLY
Qty: 10 ML | Refills: 0 | Status: SHIPPED | OUTPATIENT
Start: 2023-10-09

## 2023-10-09 RX ORDER — PARICALCITOL 2 UG/ML
12 INJECTION, SOLUTION INTRAVENOUS 3 TIMES WEEKLY
Status: DISCONTINUED | OUTPATIENT
Start: 2023-10-10 | End: 2023-10-09 | Stop reason: HOSPADM

## 2023-10-09 RX ADMIN — ASPIRIN 81 MG CHEWABLE TABLET 81 MG: 81 TABLET CHEWABLE at 08:09

## 2023-10-09 RX ADMIN — LOPERAMIDE HYDROCHLORIDE 2 MG: 2 CAPSULE ORAL at 08:11

## 2023-10-09 RX ADMIN — INSULIN GLARGINE 10 UNITS: 100 INJECTION, SOLUTION SUBCUTANEOUS at 08:18

## 2023-10-09 RX ADMIN — Medication 2000 UNITS: at 08:09

## 2023-10-09 RX ADMIN — DICLOFENAC SODIUM TOPICAL GEL, 1% 1 APPLICATION: 10 GEL TOPICAL at 12:38

## 2023-10-09 RX ADMIN — CALCIUM ACETATE 2668 MG: 667 CAPSULE ORAL at 17:50

## 2023-10-09 RX ADMIN — HYDRALAZINE HYDROCHLORIDE 25 MG: 25 TABLET, FILM COATED ORAL at 08:09

## 2023-10-09 RX ADMIN — CALCIUM ACETATE 2668 MG: 667 CAPSULE ORAL at 12:37

## 2023-10-09 RX ADMIN — CARVEDILOL 25 MG: 25 TABLET, FILM COATED ORAL at 08:10

## 2023-10-09 RX ADMIN — CALCIUM ACETATE 2668 MG: 667 CAPSULE ORAL at 08:08

## 2023-10-09 RX ADMIN — CALCITRIOL CAPSULES 0.25 MCG 0.25 MCG: 0.25 CAPSULE ORAL at 12:37

## 2023-10-09 RX ADMIN — AMLODIPINE BESYLATE 10 MG: 10 TABLET ORAL at 08:09

## 2023-10-09 RX ADMIN — HYDRALAZINE HYDROCHLORIDE 25 MG: 25 TABLET, FILM COATED ORAL at 15:47

## 2023-10-09 RX ADMIN — HYDROXYZINE HYDROCHLORIDE 25 MG: 25 TABLET, FILM COATED ORAL at 15:46

## 2023-10-09 RX ADMIN — VALSARTAN 320 MG: 320 TABLET, FILM COATED ORAL at 08:09

## 2023-10-09 RX ADMIN — Medication 1 APPLICATION: at 12:46

## 2023-10-09 RX ADMIN — FUROSEMIDE 20 MG: 20 TABLET ORAL at 08:09

## 2023-10-09 RX ADMIN — Medication 1 APPLICATION: at 15:47

## 2023-10-09 RX ADMIN — HYDROXYZINE HYDROCHLORIDE 25 MG: 25 TABLET, FILM COATED ORAL at 03:52

## 2023-10-09 ASSESSMENT — COGNITIVE AND FUNCTIONAL STATUS - GENERAL
WALKING IN HOSPITAL ROOM: A LITTLE
CLIMB 3 TO 5 STEPS WITH RAILING: A LITTLE
DRESSING REGULAR LOWER BODY CLOTHING: A LITTLE
DAILY ACTIVITIY SCORE: 20
PERSONAL GROOMING: A LITTLE
STANDING UP FROM CHAIR USING ARMS: A LITTLE
CLIMB 3 TO 5 STEPS WITH RAILING: A LITTLE
MOVING TO AND FROM BED TO CHAIR: A LITTLE
DAILY ACTIVITIY SCORE: 23
MOBILITY SCORE: 20
MOVING TO AND FROM BED TO CHAIR: A LITTLE
DRESSING REGULAR UPPER BODY CLOTHING: A LITTLE
STANDING UP FROM CHAIR USING ARMS: A LITTLE
TOILETING: A LITTLE
MOBILITY SCORE: 20
HELP NEEDED FOR BATHING: A LITTLE
WALKING IN HOSPITAL ROOM: A LITTLE

## 2023-10-09 ASSESSMENT — PAIN SCALES - GENERAL
PAINLEVEL_OUTOF10: 0 - NO PAIN
PAINLEVEL_OUTOF10: 0 - NO PAIN
PAINLEVEL_OUTOF10: 3
PAINLEVEL_OUTOF10: 0 - NO PAIN

## 2023-10-09 ASSESSMENT — ACTIVITIES OF DAILY LIVING (ADL): HOME_MANAGEMENT_TIME_ENTRY: 28

## 2023-10-09 ASSESSMENT — PAIN - FUNCTIONAL ASSESSMENT: PAIN_FUNCTIONAL_ASSESSMENT: 0-10

## 2023-10-09 NOTE — PROGRESS NOTES
Rissa Bhakta is a 47 y.o. female on day 3 of admission presenting with AMS (altered mental status).      Subjective   ***       Objective          Vitals 24HR  Heart Rate:  [65-75]   Temp:  [36.6 °C (97.9 °F)-37 °C (98.6 °F)]   Resp:  [16]   BP: (158-160)/(77-80)   SpO2:  [99 %-100 %]     {Transplant Status:[id number]}    Intake/Output last 3 Shifts:  No intake or output data in the 24 hours ending 10/09/23 0932    Physical Exam    Relevant Results  {If you would like to pull in Medications, type .meds     If you would like to pull in Lab results for the last 24 hours, type .[id number]    If you would like to pull in Imaging results, type .[id number] :99}      {Link to Stroke Scoring tools - Link :99}       Assessment/Plan              Principal Problem:    AMS (altered mental status)  Active Problems:    Altered mental status    ***          I spent *** minutes in the professional and overall care of this patient.      Adelia Stovall, APRN-CNP

## 2023-10-09 NOTE — PROGRESS NOTES
"Occupational Therapy    Evaluation/Treatment    Patient Name: Rissa Bhakta  MRN: [id number]  : 1975  Today's Date: 10/09/23  Time Calculation  Start Time: 1042  Stop Time: 1130  Time Calculation (min): 48 min       Assessment:  OT Assessment: Patient is a 47 year old female who presents with impaired ADL, functional transfer, functional mobility, bed mobility and UE strength. Patient would benefit from skilled OT services while in hospital and post discharge.  Prognosis: Good  Medical Staff Made Aware: Yes (Discussed performance and recommendations with MD.)  Prognosis: Good  Medical Staff Made Aware: Yes (Discussed performance and recommendations with MD.)  Plan:  Treatment Interventions:  (ADL retraining, functional mobility training, functional transfer training, UE strengthening, activity tolerance)  OT Frequency: 3 times per week  OT Discharge Recommendations: Low intensity level of continued care  Equipment Recommended upon Discharge:  (shower chair, reacher)  OT Recommended Transfer Status: Stand by assist  Treatment Interventions:  (ADL retraining, functional mobility training, functional transfer training, UE strengthening, activity tolerance)    Subjective   Current Problem:  1. Altered mental status          General:   OT Received On: 10/09/23  General  Reason for Referral: Patient referred to OT following admission for mental status change and missed dialysis.  Past Medical History Relevant to Rehab: ESRD, DM2, POTS, restless leg, HTN  Prior to Session Communication: Bedside nurse  Patient Position Received: Bed, 3 rail up  Precautions:  NELSON Fall Risk Score (The score of 4 or more indicates an increased risk of falling): falls risk  Vital Signs:     Pain:  Pain Assessment  Pain Assessment:  (back and legs)  Pain Score:  (Patient reports, \"just the normal aches\".  Patient reports pain with bending/reaching. Pain number not provided.)    Objective   Cognition:  Overall Cognitive Status: " Within Functional Limits (WFL overall, paient confused to day of week, oriented to month, year and situation)           Home Living:  Type of Home:  (duplex)  Lives With: Dependent children (13 &15)  Home Adaptive Equipment:  (rollator)  Home Access:  (22 steps to 2n floor, bed and full bath on 2nd floor, tub with shower)  Bathroom Toilet: Standard  Bathroom Equipment: None  Prior Function:  Level of [place]: Independent with ADLs and functional transfers, Independent with homemaking with ambulation  IADL History:  Homemaking Responsibilities: Yes  Meal Prep Responsibility: Primary  Laundry Responsibility: Primary (uses laundrymat)  Cleaning Responsibility: Primary  Bill Paying/Finance Responsibility: Primary  Shopping Responsibility: Primary  Occupation:  (not working)  ADL:  Eating Assistance: Independent  Grooming Assistance:  (Patient having difficulty combing hair due to trouble keeping arms above her head.)  Bathing Assistance:  (SBA in sitting)  UE Dressing Assistance: Independent  LE Dressing Assistance: Stand by  Toileting Assistance with Device: Stand by  Functional Assistance:  (functional mobility SBA to occasional CGA, patient tends to furniture walk.)  Activities of Daily Living:  tx: Toileting  Toileting Adaptive Equipment:  (grab bar)  Toileting Level of Assistance:  (SBA)  Where Assessed: Toilet  Socks: SBA to don and doff  SBA to don and doff robe    Activity Tolerance:  Endurance:  (fair overall, patient requires intermittent rest)  Functional Standing Tolerance:     Bed Mobility/Transfers: Bed Mobility  Bed Mobility: Yes  Bed Mobility 1  Bed Mobility 1: Supine to sitting  Level of Assistance 1:  (SBA)  Bed Mobility 2  Bed Mobility  2: Sitting to supine  Level of Assistance 2:  (SBA)    Transfer 1  Transfer From 1:  (sit<>stand from bed SBA)  Transfer Device 1:  (without device)                   Vision:Vision - Basic Assessment  Current Vision: Wears glasses all the time  Sensation:  Light  Touch:  (reports numbness and tingling right side of tongue/face and hand.  She reports that it goes away with movement)    Hand Function  Coordination: Functional  Extremities: RUE AROM (degrees)  RUE AROM Comment: WFL  RUE Strength  RUE Overall Strength:  (4/5 overall) and LUE AROM (degrees)  LUE AROM Comment: WFL  LUE Strength  LUE Overall Strength:  (4/5 overall)      Outcome Measures: [facility] Daily Activity  Putting on and taking off regular lower body clothing: A little  Bathing (including washing, rinsing, drying): A little  Putting on and taking off regular upper body clothing: A little  Toileting, which includes using toilet, bedpan or urinal: A little  Taking care of personal grooming such as brushing teeth: None  Eating Meals: None  Daily Activity - Total Score: 20         and Brief Confusion Assessment Method (bCAM)  CAM Result: CAM -    Education Documentation  ADL Training, taught by Nataliia Riley OT at 10/9/2023 11:50 AM.  Learner: Patient  Readiness: Eager  Method: Explanation  Response: Verbalizes Understanding    Education Comments  No comments found.        OP EDUCATION:       Goals:  Encounter Problems       Encounter Problems (Active)       ADLs       Patient will perform UB and LB bathing sitting on shower chair with independent level of assistance and shower chair. (Progressing)       Start:  10/09/23    Expected End:  10/16/23            Patient with complete upper body dressing with independent level of assistance donning and doffing all UE clothes with no adaptive equipment while sitting EOB (Progressing)       Start:  10/09/23    Expected End:  10/16/23            Patient with complete lower body dressing with independent level of assistance donning and doffing all LE clothes  with no adaptive equipment while sitting/standing (Progressing)       Start:  10/09/23    Expected End:  10/16/23            Patient will complete toileting including hygiene clothing management/hygiene with  independent level of assistance and grab bars. (Progressing)       Start:  10/09/23    Expected End:  10/16/23               EXERCISE/STRENGTHENING       Patient with increase BUE to 4+/5 strength. (Progressing)       Start:  10/09/23    Expected End:  10/16/23               MOBILITY       Patient will perform Functional mobility  Household distances/Community Distances with independent level of assistance and least restrictive device in order to improve safety and functional mobility. (Progressing)       Start:  10/09/23    Expected End:  10/16/23               TRANSFERS       Patient will perform bed mobility independent level of assistance and bed rails in order to improve safety and independence with mobility (Progressing)       Start:  10/09/23    Expected End:  10/16/23            Patient will complete functional transfer to all surfaces with least restrictive device with independent level of assistance. (Progressing)       Start:  10/09/23    Expected End:  10/16/23

## 2023-10-09 NOTE — DISCHARGE SUMMARY
Discharge Diagnosis  AMS (altered mental status)    Issues Requiring Follow-Up  Calcium levels with PCP    Test Results Pending At Discharge  Pending Labs       Order Current Status    Extra Tubes In process    Urine Stark Tube In process            Hospital Course     Patient is a 47 year old female with PMH of type 2 DM on insulin, ESRD on HD  on T,T,S at [facility] who was presented to the ED  for further evaluation of increased sleepiness, n/v/d, SOB, and missed HD on 10/5. She reported non adherence with insulin as supposed to at home.  She was noted to have hypoglycemia with home dose of insulin and advised Lantus 10 units at at bedtime.   She was tolerating diet normally and she had HD in the hospital.      Nausea, vomiting, and diarrhea  - no episodes of emesis, nausea, or diarrhea since pt arrived to hospital  Tolerating diet normally,      Chronic anemia 2/2 ESRD  - Hgb 11.6 on admit  - continue home dose of Ferrous sulfate 325 mg PO BID     ESRD on HD  SOB  - dyspnea likely d/t fluid overload in setting of missed HD  - dialysis center: [facility]  - oliguric  - last HD prior to admission, Tues 10/3  - continue home dose of Calcium acetate 4 caps TID with meals  - DC Calcitriol 0.25 mg PO every day as per renal,   - avoid nephrotoxic agents  - Nephrology consulted, last HD on 10/7  Advised HD on 10/10 in the community,      HTN  - last /77  - continue home dose of Coreg 25 mg PO BID, Amlodipine 10 mg PO every day, and Valsartan-hydrochlorothiazide 320-12.5 mg PO every day  Hydralazine was discontinued due to well controlled Hypertension,      HLD  - continue home dose of Zocor 20 mg PO at bedtime     T2DM (last HgA1c 8.4%)  - monitor blood glucose ACHS  - change Lantus to 10 units at bedtime,   - She had hypoglycemic episodes in the hospital , ( Patient reported non adherence with Lantus intermittenly at home)  Advised to follow up with PCP to monitor sugars, and adjust Lantus as needed,            Pertinent Physical Exam At Time of Discharge  Physical Exam  Constitutional:       Appearance: Normal appearance.   HENT:      Head: Normocephalic.      Nose: Nose normal.   Eyes:      Extraocular Movements: Extraocular movements intact.      Pupils: Pupils are equal, round, and reactive to light.   Cardiovascular:      Rate and Rhythm: Normal rate and regular rhythm.      Heart sounds: Normal heart sounds.   Pulmonary:      Effort: Pulmonary effort is normal. No respiratory distress.      Breath sounds: Normal breath sounds. No wheezing.   Abdominal:      General: Bowel sounds are normal. There is no distension.      Palpations: Abdomen is soft.      Tenderness: There is no abdominal tenderness.   Musculoskeletal:         General: Normal range of motion.      Cervical back: Normal range of motion.   Skin:     General: Skin is warm.   Neurological:      General: No focal deficit present.      Mental Status: She is alert and oriented to person, place, and time. Mental status is at baseline.   Psychiatric:         Mood and Affect: Mood normal.         Behavior: Behavior normal.         Home Medications     Medication List      CHANGE how you take these medications     insulin glargine 100 unit/mL injection; Commonly known as: Lantus;   Inject 10 Units under the skin once daily at bedtime. Take as directed per   insulin instructions.; What changed: how much to take, when to take this   NovoLOG U-100 Insulin aspart 100 unit/mL injection; Generic drug:   insulin aspart; What changed: Another medication with the same name was   removed. Continue taking this medication, and follow the directions you   see here.     CONTINUE taking these medications     aspirin 81 mg chewable tablet   aspirin-acetaminophen-caffeine 250-250-65 mg tablet; Commonly known as:   Excedrin Migraine   Calcium 500 500 mg calcium (1,250 mg) chewable tablet; Generic drug:   calcium carbonate   calcium acetate 667 mg tablet tablet; Commonly known  as: Calphron   carvedilol 25 mg tablet; Commonly known as: Coreg   cholecalciferol 50 mcg (2,000 unit) capsule; Commonly known as: Vitamin   D-3   felodipine ER 10 mg 24 hr tablet; Commonly known as: Plendil   ferrous sulfate 325 (65 Fe) MG tablet   furosemide 20 mg tablet; Commonly known as: Lasix   gabapentin 300 mg capsule; Commonly known as: Neurontin   hydrOXYzine HCL 10 mg tablet; Commonly known as: Atarax   metoclopramide 10 mg tablet; Commonly known as: Reglan   pregabalin 75 mg capsule; Commonly known as: Lyrica   rOPINIRole 1 mg tablet; Commonly known as: Requip   simvastatin 20 mg tablet; Commonly known as: Zocor   valsartan-hydrochlorothiazide 320-12.5 mg tablet; Commonly known as:   Diovan-HCT   Voltaren 1 % gel gel; Generic drug: diclofenac sodium     STOP taking these medications     calcitriol 0.25 mcg capsule; Commonly known as: Rocaltrol   hydrALAZINE 25 mg tablet; Commonly known as: Apresoline   zolpidem 10 mg tablet; Commonly known as: Ambien       Outpatient Follow-Up  No future appointments.    Discharge day management time > 30 minutes.     Sylvain Thompson MD

## 2023-10-09 NOTE — CARE PLAN
Problem: Balance  Goal: STG - Maintains static standing balance with upper extremity support with ww I   Description: INTERVENTIONS:  1. Practice standing with minimal support.  2. Educate patient about standing tolerance.  3. Educate patient about independence with gait, transfers, and ADL's.  4. Educate patient about use of assistive device.  5. Educate patient about self-directed care.  Outcome: Progressing     Problem: Mobility  Goal: STG - Patient will ambulate 150 ft with ww   Outcome: Progressing     Problem: Transfers  Goal: STG - Transfer from bed to chair with ww and I   Outcome: Progressing  Goal: STG - Patient will transfer sit to and from stand I   Outcome: Progressing

## 2023-10-09 NOTE — CARE PLAN
The patient's goals for the shift include      The clinical goals for the shift include lab values of creatinine & BUN will improve to wnl    Over the shift, the patient did not make progress toward the following goals. Barriers to progression include . Recommendations to address these barriers include .

## 2023-10-09 NOTE — PROGRESS NOTES
"Rissa Bhakta is a 47 y.o. female on day 3 of admission presenting with AMS (altered mental status).    Subjective   Pt resting in bed on phone. She denies sob at rest, n/v/d on today, fever, cough, chills, constipation, chest pains, pain       Objective     Physical Exam  Vitals and nursing note reviewed.   Cardiovascular:      Rate and Rhythm: Regular rhythm.   Pulmonary:      Breath sounds: Rales present.      Comments: Minor susi bases  O2 2L  Denies sob at rest  Abdominal:      General: There is distension.      Comments: Non tender to palpation   Genitourinary:     Comments: Has not had a stool today  Associates n/v with diarrhea  Musculoskeletal:      Cervical back: Normal range of motion.   Skin:     General: Skin is warm and dry.   Neurological:      Mental Status: She is alert and oriented to person, place, and time.   Psychiatric:         Mood and Affect: Mood normal.         Behavior: Behavior normal.         Last Recorded Vitals  Blood pressure 152/71, pulse 72, temperature 36.9 °C (98.4 °F), temperature source Tympanic, resp. rate 18, height 1.626 m (5' 4.02\"), weight 97.5 kg (214 lb 15.2 oz), SpO2 96 %.  Intake/Output last 3 Shifts:  I/O last 3 completed shifts:  In: 1780 (18.3 mL/kg) [P.O.:580; I.V.:400 (4.1 mL/kg); Other:800]  Out: 4206 (43.1 mL/kg) [Other:4206]  Weight: 97.5 kg   Scheduled medications  amLODIPine, 10 mg, oral, Daily  aspirin, 81 mg, oral, Daily  calcitriol, 0.25 mcg, oral, Daily  calcium acetate, 2,668 mg, oral, TID with meals  carvedilol, 25 mg, oral, BID  cholecalciferol, 2,000 Units, oral, Daily  emollient combination no.92, 1 Application, Topical, TID  furosemide, 20 mg, oral, Daily  gabapentin, 300 mg, oral, Nightly  hydrALAZINE, 25 mg, oral, TID  insulin glargine, 10 Units, subcutaneous, BID  insulin lispro, 0-10 Units, subcutaneous, TID with meals  [START ON 10/10/2023] paricalcitol, 12 mcg, intravenous, Once per day on Tue Thu Sat  rOPINIRole, 1 mg, oral, " Nightly  simvastatin, 20 mg, oral, Nightly  valsartan, 320 mg, oral, Daily      Continuous medications     PRN medications  PRN medications: dextrose 10 % in water (D10W), diclofenac sodium, glucagon, hydrOXYzine HCL, loperamide, ondansetron, zolpidem     Results for orders placed or performed during the hospital encounter of 10/06/23 (from the past 24 hour(s))   POCT GLUCOSE   Result Value Ref Range    POCT Glucose 97 74 - 99 mg/dL   POCT GLUCOSE   Result Value Ref Range    POCT Glucose 125 (H) 74 - 99 mg/dL   CBC   Result Value Ref Range    WBC 3.7 (L) 4.4 - 11.3 x10*3/uL    nRBC 0.0 0.0 - 0.0 /100 WBCs    RBC 4.10 4.00 - 5.20 x10*6/uL    Hemoglobin 11.1 (L) 12.0 - 16.0 g/dL    Hematocrit 36.2 36.0 - 46.0 %    MCV 88 80 - 100 fL    MCH 27.1 26.0 - 34.0 pg    MCHC 30.7 (L) 32.0 - 36.0 g/dL    RDW 18.3 (H) 11.5 - 14.5 %    Platelets 78 (L) 150 - 450 x10*3/uL    MPV     Comprehensive metabolic panel   Result Value Ref Range    Glucose 129 (H) 74 - 99 mg/dL    Sodium 135 (L) 136 - 145 mmol/L    Potassium 4.0 3.5 - 5.3 mmol/L    Chloride 97 (L) 98 - 107 mmol/L    Bicarbonate 28 21 - 32 mmol/L    Anion Gap 14 10 - 20 mmol/L    Urea Nitrogen 30 (H) 6 - 23 mg/dL    Creatinine 6.50 (H) 0.50 - 1.05 mg/dL    eGFR 7 (L) >60 mL/min/1.73m*2    Calcium 10.9 (H) 8.6 - 10.6 mg/dL    Albumin 3.5 3.4 - 5.0 g/dL    Alkaline Phosphatase 43 33 - 110 U/L    Total Protein 7.2 6.4 - 8.2 g/dL    AST 9 9 - 39 U/L    Bilirubin, Total 1.0 0.0 - 1.2 mg/dL    ALT 7 7 - 45 U/L   POCT GLUCOSE   Result Value Ref Range    POCT Glucose 144 (H) 74 - 99 mg/dL   POCT GLUCOSE   Result Value Ref Range    POCT Glucose 126 (H) 74 - 99 mg/dL                             Assessment/Plan   Principal Problem:    AMS (altered mental status)  Active Problems:    Altered mental status    -presenting to the ED today with complaints of increasing sleepiness, nausea, vomiting, and diarrhea since yesterday after missing her HD appointment. History is limited by  patient's somnolence during discussion. Patient reports yesterday she was sleepy/slow moving which caused her to miss her transportation to dialysis. She reports that this morning, her sleepiness has progressed, and she has developed nausea, vomiting, and diarrhea     Tolerated hemodialysis Saturday with net fluid loss 1402cc    Is hemodynamically stable, hypervolemic on exam and has stable electrolytes      Outpatient Dialysis schedule:   [facility]/Dr. Kay Carlin     Access: - Lt Fistula with +thrill/bruit no issues - able to achieve      Anemia of ESRD:   not on arley..current hgb 11.1..will cont to monitor     CKD-MBD Phosphate Binder: calcitriol (Rocaltrol) capsule 0.25mcg daily, calcium acetate (Phoslo) capsule 2,668 mg tid with meals,  cholecalciferol (Vitamin D-3) tablet 2,000 Units  daily, paricalcitol     Plan HD tomorrow with UF as tolerated     Renal diet      Please obtain daily standing wt (if possible)     Medication to be adjusted for ESRD      Patient to continue regular HD schedule while inpatient and to follow with the outpatient nephrologist at discharge              RAVINDER Zheng-CNP

## 2023-10-09 NOTE — PROGRESS NOTES
Physical Therapy    Physical Therapy Evaluation    Patient Name: Rissa Bhakta  MRN: [id number]  Today's Date: 10/9/2023   Time Calculation  Start Time: 1139  Stop Time: 1155  Time Calculation (min): 16 min    Assessment/Plan   PT Assessment  PT Assessment Results: Decreased strength, Decreased endurance, Decreased mobility  Rehab Prognosis: Good  Assessment Comment: pt with decrease endurance and strength and would benefit from services to improve strength and endurnace.  End of Session Patient Position: Bed, 2 rail up  IP OR SWING BED PT PLAN  Inpatient or Swing Bed: Inpatient  PT Plan  PT Plan: Skilled PT  PT Frequency: 3 times per week  PT Discharge Recommendations: Other (comment) (pt would benefit from out pt PT. pt very agreeable.)      Subjective   General Visit Information:  General  Reason for Referral: change in mental status and dialysis care  Past Medical History Relevant to Rehab: ESRD, DM2, POTS, restless leg, HTN  Family/Caregiver Present: No  Prior to Session Communication: Bedside nurse  Patient Position Received: Bed, 3 rail up  Home Living:  Home Living  Type of Home: House  Lives With: Dependent children  Home Adaptive Equipment:  (rollator)  Home Access: Stairs to enter with rails (22 stairs to 2nd floor bed and full bath)  Prior Level of Function:  Prior Function Per Pt/Caregiver Report  Level of [place]: Independent with ADLs and functional transfers  Precautions:     Vital Signs:  Vital Signs  Heart Rate: 74  SpO2: 96 %  BP: 133/74    Objective   Pain:  Pain Assessment  Pain Assessment: 0-10  Pain Score: 0 - No pain  Cognition:  Cognition  Overall Cognitive Status: Within Functional Limits    General Assessments:    Activity Tolerance  Endurance: Tolerates less than 10 min exercise, no significant change in vital signs    Sensation  Light Touch:  (reports numbness and tingling right side of tongue/face and hand.  She reports that it goes away with movement)    Static Sitting  Balance  Static Sitting-Level of Assistance: Independent    Static Standing Balance  Static Standing-Level of Assistance: Contact guard, Close supervision  Functional Assessments:       Bed Mobility  Bed Mobility: Yes  Bed Mobility 1  Bed Mobility 1: Supine to sitting  Level of Assistance 1: Close supervision  Bed Mobility 2  Bed Mobility  2: Sitting to supine  Level of Assistance 2: Close supervision    Transfers  Transfer: Yes  Transfer 1  Transfer From 1: Sit to  Transfer to 1: Stand  Transfer Device 1: Walker  Transfer Level of Assistance 1: Close supervision  Transfers 2  Transfer From 2: Stand to  Transfer to 2: Sit  Transfer Device 2: Walker  Transfer Level of Assistance 2: Close supervision    Ambulation/Gait Training  Ambulation/Gait Training Performed: Yes  Ambulation/Gait Training 1  Surface 1: Level tile  Device 1: Rolling walker  Assistance 1: Close supervision  Quality of Gait 1:  (slow carolin, decrease step length, lateral sway)  Comments/Distance (ft) 1: pt ambulated 30 ft    Stairs  Stairs: Yes  Stairs  Rails 1: Left  Assistance 1: Contact guard  Comment/Number of Steps 1: pt completed 3 stairs, increase difficulty with ascending. step to gait pattern      Outcome Measures:  [facility] Basic Mobility  Turning from your back to your side while in a flat bed without using bedrails: None  Moving from lying on your back to sitting on the side of a flat bed without using bedrails: None  Moving to and from bed to chair (including a wheelchair): A little  Standing up from a chair using your arms (e.g. wheelchair or bedside chair): A little  To walk in hospital room: A little  Climbing 3-5 steps with railing: A little  Basic Mobility - Total Score: 20    Encounter Problems       Encounter Problems (Active)       Balance       STG - Maintains static standing balance with upper extremity support with ww I  (Progressing)       Start:  10/09/23    Expected End:  10/23/23       INTERVENTIONS:  1. Practice standing  with minimal support.  2. Educate patient about standing tolerance.  3. Educate patient about independence with gait, transfers, and ADL's.  4. Educate patient about use of assistive device.  5. Educate patient about self-directed care.            Mobility       STG - Patient will ambulate 150 ft with ww  (Progressing)       Start:  10/09/23    Expected End:  10/23/23               Transfers       STG - Transfer from bed to chair with ww and I  (Progressing)       Start:  10/09/23    Expected End:  10/23/23            STG - Patient will transfer sit to and from stand I  (Progressing)       Start:  10/09/23    Expected End:  10/23/23                   Education Documentation  ADL Training, taught by Casie Martinez, PT at 10/9/2023 12:57 PM.  Learner: Patient  Readiness: Acceptance  Method: Demonstration  Response: Demonstrated Understanding    Education Comments  No comments found.

## 2023-10-09 NOTE — CARE PLAN
The patient's goals for the shift include      The clinical goals for the shift include Back to baseline kidney function    Over the shift, the patient did not make progress toward the following goals. Barriers to progression include . Recommendations to address these barriers include   Problem: Fall/Injury  Goal: Not fall by end of shift  Outcome: Progressing  Goal: Verbalize understanding of personal risk factors for fall in the hospital  Outcome: Progressing  Goal: Verbalize understanding of risk factor reduction measures to prevent injury from fall in the home  Outcome: Progressing     Problem: Skin  Goal: Participates in plan/prevention/treatment measures  Outcome: Progressing  Goal: Prevent/manage excess moisture  Outcome: Progressing  Goal: Prevent/minimize sheer/friction injuries  Outcome: Progressing  Goal: Promote/optimize nutrition  Outcome: Progressing     Problem: Pain  Goal: Takes deep breaths with improved pain control throughout the shift  Outcome: Progressing  Goal: Walks with improved pain control throughout the shift  Outcome: Progressing  Goal: Performs ADL's with improved pain control throughout shift  Outcome: Progressing  Goal: Participates in PT with improved pain control throughout the shift  Outcome: Progressing  Goal: Free from opioid side effects throughout the shift  Outcome: Progressing     Problem: Diabetes  Goal: Achieve decreasing blood glucose levels by end of shift  Outcome: Progressing  Goal: Increase stability of blood glucose readings by end of shift  Outcome: Progressing  Goal: Decrease in ketones present in urine by end of shift  Outcome: Progressing  Goal: Maintain electrolyte levels within acceptable range throughout shift  Outcome: Progressing  Goal: Maintain glucose levels >70mg/dl to <250mg/dl throughout shift  Outcome: Progressing  Goal: No changes in neurological exam by end of shift  Outcome: Progressing  Goal: Learn about and adhere to nutrition recommendations by end  of shift  Outcome: Progressing  Goal: Vital signs within normal range for age by end of shift  Outcome: Progressing  Goal: Increase self care and/or family involovement by end of shift  Outcome: Progressing  Goal: Receive DSME education by end of shift  Outcome: Progressing   .

## 2023-10-09 NOTE — CARE PLAN
Problem: ADLs  Goal: Patient will perform UB and LB bathing sitting on shower chair with independent level of assistance and shower chair.  Outcome: Progressing  Goal: Patient with complete upper body dressing with independent level of assistance donning and doffing all UE clothes with no adaptive equipment while sitting EOB  Outcome: Progressing  Goal: Patient with complete lower body dressing with independent level of assistance donning and doffing all LE clothes  with no adaptive equipment while sitting/standing  Outcome: Progressing  Goal: Patient will complete toileting including hygiene clothing management/hygiene with independent level of assistance and grab bars.  Outcome: Progressing

## 2023-10-09 NOTE — CARE PLAN
TCC spoke with pt to discuss care team reccs for LOW intensity. Pt states she prefers outpt PT and would like info on waiver services, housing resources, and counseling resources. TCC will make SW and CHW aware. TCC gave pt a list out outpt therapy facilities and made attending aware pt would need an outpt PT script. Will update pt's RN as well.

## 2023-10-10 NOTE — CARE PLAN
10/10 1301 CHW Note  This CHW contacted the pt via outbound phone call to introduce self and role.  Pt stated she wanted to move and is requesting low income housing information.  Pt also lost her fiance and sister 2 years ago and is agreeable receiving to grief counseling supportive services.  Pt also interested in waiver services, I informed the pt that since she has a [employer] *Dual* plan, that she will have to contact her insurance to inquire about a  and for them to submit a referral; I am unable to do so through [email or web address] on behalf of client due to insurance stipulation.  Pt requesting that all information be sent to her email ([email or web address]).  To her email, I sent her [facility] [facility] and a low income housing organization list, [facility] Grief counseling support list, and information on her [facility] for waiver services.  Pt declined any further need at this time.

## 2023-10-11 ENCOUNTER — DOCUMENTATION (OUTPATIENT)
Dept: CARE COORDINATION | Facility: CLINIC | Age: 48
End: 2023-10-11
Payer: COMMERCIAL

## 2023-10-11 NOTE — PROGRESS NOTES
Outreach call to patient to support a smooth transition of care from recent admission.  Left voicemail message for patient with my contact information ([phone]).

## 2023-10-26 LAB — HOLD SPECIMEN: NORMAL

## 2023-11-02 ENCOUNTER — PATIENT OUTREACH (OUTPATIENT)
Dept: CARE COORDINATION | Facility: CLINIC | Age: 48
End: 2023-11-02
Payer: COMMERCIAL

## 2023-11-02 NOTE — PROGRESS NOTES
Outreach call to patient to support a smooth transition of care from recent admission.  Left voicemail message for patient with my contact information [phone].

## 2024-02-22 ENCOUNTER — HOSPITAL ENCOUNTER (OUTPATIENT)
Facility: HOSPITAL | Age: 49
Setting detail: OBSERVATION
Discharge: HOME | End: 2024-02-24
Attending: EMERGENCY MEDICINE | Admitting: STUDENT IN AN ORGANIZED HEALTH CARE EDUCATION/TRAINING PROGRAM
Payer: COMMERCIAL

## 2024-02-22 ENCOUNTER — APPOINTMENT (OUTPATIENT)
Dept: RADIOLOGY | Facility: HOSPITAL | Age: 49
End: 2024-02-22
Payer: COMMERCIAL

## 2024-02-22 ENCOUNTER — CLINICAL SUPPORT (OUTPATIENT)
Dept: EMERGENCY MEDICINE | Facility: HOSPITAL | Age: 49
End: 2024-02-22
Payer: COMMERCIAL

## 2024-02-22 DIAGNOSIS — K59.1 FUNCTIONAL DIARRHEA: ICD-10-CM

## 2024-02-22 DIAGNOSIS — R79.89 TROPONIN LEVEL ELEVATED: ICD-10-CM

## 2024-02-22 DIAGNOSIS — E87.79 OTHER HYPERVOLEMIA: ICD-10-CM

## 2024-02-22 DIAGNOSIS — E87.70 HYPERVOLEMIA, UNSPECIFIED HYPERVOLEMIA TYPE: Primary | ICD-10-CM

## 2024-02-22 DIAGNOSIS — K21.9 GASTROESOPHAGEAL REFLUX DISEASE, UNSPECIFIED WHETHER ESOPHAGITIS PRESENT: ICD-10-CM

## 2024-02-22 LAB
ACANTHOCYTES BLD QL SMEAR: ABNORMAL
ALBUMIN SERPL BCP-MCNC: 3.6 G/DL (ref 3.4–5)
ALP SERPL-CCNC: 61 U/L (ref 33–110)
ALT SERPL W P-5'-P-CCNC: 7 U/L (ref 7–45)
ANION GAP BLDV CALCULATED.4IONS-SCNC: 7 MMOL/L (ref 10–25)
ANION GAP SERPL CALC-SCNC: 16 MMOL/L (ref 10–20)
APTT PPP: 37 SECONDS (ref 27–38)
AST SERPL W P-5'-P-CCNC: 18 U/L (ref 9–39)
ATRIAL RATE: 68 BPM
ATRIAL RATE: 69 BPM
BASE EXCESS BLDV CALC-SCNC: 8.7 MMOL/L (ref -2–3)
BASOPHILS # BLD MANUAL: 0.07 X10*3/UL (ref 0–0.1)
BASOPHILS NFR BLD MANUAL: 2.6 %
BILIRUB SERPL-MCNC: 1.2 MG/DL (ref 0–1.2)
BNP SERPL-MCNC: 805 PG/ML (ref 0–99)
BODY TEMPERATURE: 37 DEGREES CELSIUS
BUN SERPL-MCNC: 20 MG/DL (ref 6–23)
BURR CELLS BLD QL SMEAR: ABNORMAL
CA-I BLDV-SCNC: 0.95 MMOL/L (ref 1.1–1.33)
CALCIUM SERPL-MCNC: 8.4 MG/DL (ref 8.6–10.6)
CARDIAC TROPONIN I PNL SERPL HS: 41 NG/L (ref 0–34)
CARDIAC TROPONIN I PNL SERPL HS: 50 NG/L (ref 0–34)
CHLORIDE BLDV-SCNC: 97 MMOL/L (ref 98–107)
CHLORIDE SERPL-SCNC: 94 MMOL/L (ref 98–107)
CO2 SERPL-SCNC: 30 MMOL/L (ref 21–32)
CREAT SERPL-MCNC: 5.97 MG/DL (ref 0.5–1.05)
EGFRCR SERPLBLD CKD-EPI 2021: 8 ML/MIN/1.73M*2
EOSINOPHIL # BLD MANUAL: 0.02 X10*3/UL (ref 0–0.7)
EOSINOPHIL NFR BLD MANUAL: 0.8 %
ERYTHROCYTE [DISTWIDTH] IN BLOOD BY AUTOMATED COUNT: 17.4 % (ref 11.5–14.5)
GLUCOSE BLDV-MCNC: 84 MG/DL (ref 74–99)
GLUCOSE SERPL-MCNC: 74 MG/DL (ref 74–99)
HCO3 BLDV-SCNC: 34.5 MMOL/L (ref 22–26)
HCT VFR BLD AUTO: 35.1 % (ref 36–46)
HCT VFR BLD EST: 35 % (ref 36–46)
HGB BLD-MCNC: 11.4 G/DL (ref 12–16)
HGB BLDV-MCNC: 11.7 G/DL (ref 12–16)
IMM GRANULOCYTES # BLD AUTO: 0.01 X10*3/UL (ref 0–0.7)
IMM GRANULOCYTES NFR BLD AUTO: 0.4 % (ref 0–0.9)
INHALED O2 CONCENTRATION: 21 %
INR PPP: 1.2 (ref 0.9–1.1)
LACTATE BLDV-SCNC: 1.1 MMOL/L (ref 0.4–2)
LYMPHOCYTES # BLD MANUAL: 0.72 X10*3/UL (ref 1.2–4.8)
LYMPHOCYTES NFR BLD MANUAL: 27.6 %
MCH RBC QN AUTO: 27.8 PG (ref 26–34)
MCHC RBC AUTO-ENTMCNC: 32.5 G/DL (ref 32–36)
MCV RBC AUTO: 86 FL (ref 80–100)
MONOCYTES # BLD MANUAL: 0.02 X10*3/UL (ref 0.1–1)
MONOCYTES NFR BLD MANUAL: 0.9 %
NEUTROPHILS # BLD MANUAL: 1.64 X10*3/UL (ref 1.2–7.7)
NEUTS BAND # BLD MANUAL: 0.2 X10*3/UL (ref 0–0.7)
NEUTS BAND NFR BLD MANUAL: 7.7 %
NEUTS SEG # BLD MANUAL: 1.44 X10*3/UL (ref 1.2–7)
NEUTS SEG NFR BLD MANUAL: 55.2 %
NRBC BLD-RTO: 0 /100 WBCS (ref 0–0)
OVALOCYTES BLD QL SMEAR: ABNORMAL
OXYHGB MFR BLDV: 45.5 % (ref 45–75)
P AXIS: 39 DEGREES
P AXIS: 44 DEGREES
P OFFSET: 190 MS
P OFFSET: 191 MS
P ONSET: 121 MS
P ONSET: 121 MS
PCO2 BLDV: 52 MM HG (ref 41–51)
PH BLDV: 7.43 PH (ref 7.33–7.43)
PLATELET # BLD AUTO: 85 X10*3/UL (ref 150–450)
PO2 BLDV: 34 MM HG (ref 35–45)
POTASSIUM BLDV-SCNC: 3.4 MMOL/L (ref 3.5–5.3)
POTASSIUM SERPL-SCNC: 3.4 MMOL/L (ref 3.5–5.3)
PR INTERVAL: 180 MS
PR INTERVAL: 182 MS
PROT SERPL-MCNC: 9.1 G/DL (ref 6.4–8.2)
PROTHROMBIN TIME: 13.5 SECONDS (ref 9.8–12.8)
Q ONSET: 211 MS
Q ONSET: 212 MS
QRS COUNT: 11 BEATS
QRS COUNT: 11 BEATS
QRS DURATION: 96 MS
QRS DURATION: 96 MS
QT INTERVAL: 460 MS
QT INTERVAL: 472 MS
QTC CALCULATION(BAZETT): 492 MS
QTC CALCULATION(BAZETT): 501 MS
QTC FREDERICIA: 482 MS
QTC FREDERICIA: 492 MS
R AXIS: 40 DEGREES
R AXIS: 54 DEGREES
RBC # BLD AUTO: 4.1 X10*6/UL (ref 4–5.2)
RBC MORPH BLD: ABNORMAL
SAO2 % BLDV: 46 % (ref 45–75)
SODIUM BLDV-SCNC: 135 MMOL/L (ref 136–145)
SODIUM SERPL-SCNC: 137 MMOL/L (ref 136–145)
T AXIS: -19 DEGREES
T AXIS: 25 DEGREES
T OFFSET: 441 MS
T OFFSET: 448 MS
TOTAL CELLS COUNTED BLD: 116
VARIANT LYMPHS # BLD MANUAL: 0.14 X10*3/UL (ref 0–0.5)
VARIANT LYMPHS NFR BLD: 5.2 %
VENTRICULAR RATE: 68 BPM
VENTRICULAR RATE: 69 BPM
WBC # BLD AUTO: 2.6 X10*3/UL (ref 4.4–11.3)

## 2024-02-22 PROCEDURE — 2500000004 HC RX 250 GENERAL PHARMACY W/ HCPCS (ALT 636 FOR OP/ED): Mod: SE | Performed by: STUDENT IN AN ORGANIZED HEALTH CARE EDUCATION/TRAINING PROGRAM

## 2024-02-22 PROCEDURE — 96365 THER/PROPH/DIAG IV INF INIT: CPT | Mod: 59

## 2024-02-22 PROCEDURE — 2550000001 HC RX 255 CONTRASTS: Mod: SE | Performed by: STUDENT IN AN ORGANIZED HEALTH CARE EDUCATION/TRAINING PROGRAM

## 2024-02-22 PROCEDURE — 84132 ASSAY OF SERUM POTASSIUM: CPT | Mod: 91 | Performed by: EMERGENCY MEDICINE

## 2024-02-22 PROCEDURE — 71046 X-RAY EXAM CHEST 2 VIEWS: CPT

## 2024-02-22 PROCEDURE — 84484 ASSAY OF TROPONIN QUANT: CPT | Mod: 91 | Performed by: STUDENT IN AN ORGANIZED HEALTH CARE EDUCATION/TRAINING PROGRAM

## 2024-02-22 PROCEDURE — 80307 DRUG TEST PRSMV CHEM ANLYZR: CPT | Performed by: STUDENT IN AN ORGANIZED HEALTH CARE EDUCATION/TRAINING PROGRAM

## 2024-02-22 PROCEDURE — 2500000001 HC RX 250 WO HCPCS SELF ADMINISTERED DRUGS (ALT 637 FOR MEDICARE OP): Mod: SE | Performed by: STUDENT IN AN ORGANIZED HEALTH CARE EDUCATION/TRAINING PROGRAM

## 2024-02-22 PROCEDURE — 99285 EMERGENCY DEPT VISIT HI MDM: CPT | Mod: 25

## 2024-02-22 PROCEDURE — 85007 BL SMEAR W/DIFF WBC COUNT: CPT | Performed by: EMERGENCY MEDICINE

## 2024-02-22 PROCEDURE — 36415 COLL VENOUS BLD VENIPUNCTURE: CPT | Performed by: EMERGENCY MEDICINE

## 2024-02-22 PROCEDURE — G0378 HOSPITAL OBSERVATION PER HR: HCPCS

## 2024-02-22 PROCEDURE — 2500000004 HC RX 250 GENERAL PHARMACY W/ HCPCS (ALT 636 FOR OP/ED): Mod: JZ,JG,SE | Performed by: STUDENT IN AN ORGANIZED HEALTH CARE EDUCATION/TRAINING PROGRAM

## 2024-02-22 PROCEDURE — 99285 EMERGENCY DEPT VISIT HI MDM: CPT | Performed by: STUDENT IN AN ORGANIZED HEALTH CARE EDUCATION/TRAINING PROGRAM

## 2024-02-22 PROCEDURE — 83690 ASSAY OF LIPASE: CPT | Performed by: STUDENT IN AN ORGANIZED HEALTH CARE EDUCATION/TRAINING PROGRAM

## 2024-02-22 PROCEDURE — 82150 ASSAY OF AMYLASE: CPT | Performed by: STUDENT IN AN ORGANIZED HEALTH CARE EDUCATION/TRAINING PROGRAM

## 2024-02-22 PROCEDURE — 96375 TX/PRO/DX INJ NEW DRUG ADDON: CPT

## 2024-02-22 PROCEDURE — 84484 ASSAY OF TROPONIN QUANT: CPT | Performed by: EMERGENCY MEDICINE

## 2024-02-22 PROCEDURE — 86706 HEP B SURFACE ANTIBODY: CPT | Performed by: INTERNAL MEDICINE

## 2024-02-22 PROCEDURE — 85027 COMPLETE CBC AUTOMATED: CPT | Performed by: EMERGENCY MEDICINE

## 2024-02-22 PROCEDURE — 96376 TX/PRO/DX INJ SAME DRUG ADON: CPT

## 2024-02-22 PROCEDURE — 93005 ELECTROCARDIOGRAM TRACING: CPT

## 2024-02-22 PROCEDURE — 74177 CT ABD & PELVIS W/CONTRAST: CPT

## 2024-02-22 PROCEDURE — 93010 ELECTROCARDIOGRAM REPORT: CPT | Performed by: STUDENT IN AN ORGANIZED HEALTH CARE EDUCATION/TRAINING PROGRAM

## 2024-02-22 PROCEDURE — 85730 THROMBOPLASTIN TIME PARTIAL: CPT | Mod: 91 | Performed by: STUDENT IN AN ORGANIZED HEALTH CARE EDUCATION/TRAINING PROGRAM

## 2024-02-22 PROCEDURE — 99223 1ST HOSP IP/OBS HIGH 75: CPT | Performed by: STUDENT IN AN ORGANIZED HEALTH CARE EDUCATION/TRAINING PROGRAM

## 2024-02-22 PROCEDURE — 83880 ASSAY OF NATRIURETIC PEPTIDE: CPT | Performed by: EMERGENCY MEDICINE

## 2024-02-22 PROCEDURE — 84132 ASSAY OF SERUM POTASSIUM: CPT | Performed by: EMERGENCY MEDICINE

## 2024-02-22 PROCEDURE — 74177 CT ABD & PELVIS W/CONTRAST: CPT | Mod: FOREIGN READ | Performed by: RADIOLOGY

## 2024-02-22 PROCEDURE — 71046 X-RAY EXAM CHEST 2 VIEWS: CPT | Mod: FOREIGN READ | Performed by: RADIOLOGY

## 2024-02-22 PROCEDURE — 2500000002 HC RX 250 W HCPCS SELF ADMINISTERED DRUGS (ALT 637 FOR MEDICARE OP, ALT 636 FOR OP/ED): Mod: SE,MUE | Performed by: STUDENT IN AN ORGANIZED HEALTH CARE EDUCATION/TRAINING PROGRAM

## 2024-02-22 RX ORDER — CALCIUM ACETATE 667 MG/1
2668 CAPSULE ORAL
Status: DISCONTINUED | OUTPATIENT
Start: 2024-02-23 | End: 2024-02-24 | Stop reason: HOSPADM

## 2024-02-22 RX ORDER — AMLODIPINE BESYLATE 10 MG/1
10 TABLET ORAL DAILY
Status: DISCONTINUED | OUTPATIENT
Start: 2024-02-23 | End: 2024-02-24 | Stop reason: HOSPADM

## 2024-02-22 RX ORDER — NAPROXEN SODIUM 220 MG/1
243 TABLET, FILM COATED ORAL ONCE
Status: COMPLETED | OUTPATIENT
Start: 2024-02-22 | End: 2024-02-22

## 2024-02-22 RX ORDER — VALSARTAN 320 MG/1
320 TABLET ORAL DAILY
Status: DISCONTINUED | OUTPATIENT
Start: 2024-02-23 | End: 2024-02-24 | Stop reason: HOSPADM

## 2024-02-22 RX ORDER — GABAPENTIN 100 MG/1
100 CAPSULE ORAL 3 TIMES WEEKLY
Status: DISCONTINUED | OUTPATIENT
Start: 2024-02-23 | End: 2024-02-24 | Stop reason: HOSPADM

## 2024-02-22 RX ORDER — ACETAMINOPHEN 10 MG/ML
1000 INJECTION, SOLUTION INTRAVENOUS EVERY 6 HOURS SCHEDULED
Status: COMPLETED | OUTPATIENT
Start: 2024-02-23 | End: 2024-02-23

## 2024-02-22 RX ORDER — ONDANSETRON HYDROCHLORIDE 2 MG/ML
4 INJECTION, SOLUTION INTRAVENOUS ONCE
Status: COMPLETED | OUTPATIENT
Start: 2024-02-22 | End: 2024-02-22

## 2024-02-22 RX ORDER — METOCLOPRAMIDE 10 MG/1
10 TABLET ORAL 2 TIMES DAILY
Status: DISCONTINUED | OUTPATIENT
Start: 2024-02-22 | End: 2024-02-24 | Stop reason: HOSPADM

## 2024-02-22 RX ORDER — HYDROMORPHONE HYDROCHLORIDE 1 MG/ML
0.5 INJECTION, SOLUTION INTRAMUSCULAR; INTRAVENOUS; SUBCUTANEOUS ONCE
Status: COMPLETED | OUTPATIENT
Start: 2024-02-22 | End: 2024-02-22

## 2024-02-22 RX ORDER — CALCIUM GLUCONATE 20 MG/ML
2 INJECTION, SOLUTION INTRAVENOUS ONCE
Status: COMPLETED | OUTPATIENT
Start: 2024-02-22 | End: 2024-02-22

## 2024-02-22 RX ORDER — CALCIUM ACETATE 667 MG
2668 TABLET ORAL
Status: DISCONTINUED | OUTPATIENT
Start: 2024-02-23 | End: 2024-02-22

## 2024-02-22 RX ORDER — METOCLOPRAMIDE HYDROCHLORIDE 5 MG/ML
10 INJECTION INTRAMUSCULAR; INTRAVENOUS ONCE
Status: COMPLETED | OUTPATIENT
Start: 2024-02-22 | End: 2024-02-22

## 2024-02-22 RX ORDER — CARVEDILOL 25 MG/1
25 TABLET ORAL 2 TIMES DAILY
Status: DISCONTINUED | OUTPATIENT
Start: 2024-02-22 | End: 2024-02-24 | Stop reason: HOSPADM

## 2024-02-22 RX ORDER — HYDROXYZINE HYDROCHLORIDE 10 MG/1
10 TABLET, FILM COATED ORAL EVERY 8 HOURS PRN
Status: DISCONTINUED | OUTPATIENT
Start: 2024-02-22 | End: 2024-02-24 | Stop reason: HOSPADM

## 2024-02-22 RX ORDER — FERROUS SULFATE 325(65) MG
65 TABLET ORAL 2 TIMES DAILY
Status: DISCONTINUED | OUTPATIENT
Start: 2024-02-22 | End: 2024-02-24 | Stop reason: HOSPADM

## 2024-02-22 RX ORDER — ROPINIROLE 1 MG/1
1 TABLET, FILM COATED ORAL NIGHTLY
Status: DISCONTINUED | OUTPATIENT
Start: 2024-02-22 | End: 2024-02-24 | Stop reason: HOSPADM

## 2024-02-22 RX ORDER — POTASSIUM CHLORIDE 20 MEQ/1
20 TABLET, EXTENDED RELEASE ORAL ONCE
Status: COMPLETED | OUTPATIENT
Start: 2024-02-22 | End: 2024-02-22

## 2024-02-22 RX ORDER — FUROSEMIDE 20 MG/1
20 TABLET ORAL DAILY
Status: DISCONTINUED | OUTPATIENT
Start: 2024-02-23 | End: 2024-02-24 | Stop reason: HOSPADM

## 2024-02-22 RX ORDER — BUMETANIDE 0.25 MG/ML
1 INJECTION INTRAMUSCULAR; INTRAVENOUS ONCE
Status: COMPLETED | OUTPATIENT
Start: 2024-02-22 | End: 2024-02-22

## 2024-02-22 RX ORDER — POTASSIUM CHLORIDE 1.5 G/1.58G
20 POWDER, FOR SOLUTION ORAL ONCE
Status: COMPLETED | OUTPATIENT
Start: 2024-02-22 | End: 2024-02-22

## 2024-02-22 RX ORDER — NAPROXEN SODIUM 220 MG/1
81 TABLET, FILM COATED ORAL DAILY
Status: DISCONTINUED | OUTPATIENT
Start: 2024-02-23 | End: 2024-02-24 | Stop reason: HOSPADM

## 2024-02-22 RX ORDER — SIMVASTATIN 20 MG/1
20 TABLET, FILM COATED ORAL NIGHTLY
Status: DISCONTINUED | OUTPATIENT
Start: 2024-02-22 | End: 2024-02-24 | Stop reason: HOSPADM

## 2024-02-22 RX ORDER — HYDROCHLOROTHIAZIDE 25 MG/1
12.5 TABLET ORAL DAILY
Status: DISCONTINUED | OUTPATIENT
Start: 2024-02-23 | End: 2024-02-24 | Stop reason: HOSPADM

## 2024-02-22 RX ORDER — ONDANSETRON HYDROCHLORIDE 2 MG/ML
4 INJECTION, SOLUTION INTRAVENOUS EVERY 6 HOURS PRN
Status: DISCONTINUED | OUTPATIENT
Start: 2024-02-22 | End: 2024-02-24 | Stop reason: HOSPADM

## 2024-02-22 RX ADMIN — CALCIUM GLUCONATE 2 G: 20 INJECTION, SOLUTION INTRAVENOUS at 15:44

## 2024-02-22 RX ADMIN — CARVEDILOL 25 MG: 25 TABLET, FILM COATED ORAL at 21:30

## 2024-02-22 RX ADMIN — ONDANSETRON 4 MG: 2 INJECTION INTRAMUSCULAR; INTRAVENOUS at 22:34

## 2024-02-22 RX ADMIN — ASPIRIN 81 MG CHEWABLE TABLET 243 MG: 81 TABLET CHEWABLE at 15:36

## 2024-02-22 RX ADMIN — POTASSIUM CHLORIDE 20 MEQ: 1500 TABLET, EXTENDED RELEASE ORAL at 21:13

## 2024-02-22 RX ADMIN — HYDROMORPHONE HYDROCHLORIDE 0.5 MG: 1 INJECTION, SOLUTION INTRAMUSCULAR; INTRAVENOUS; SUBCUTANEOUS at 15:36

## 2024-02-22 RX ADMIN — BUMETANIDE 1 MG: 0.25 INJECTION, SOLUTION INTRAMUSCULAR; INTRAVENOUS at 21:03

## 2024-02-22 RX ADMIN — IOHEXOL 80 ML: 350 INJECTION, SOLUTION INTRAVENOUS at 16:23

## 2024-02-22 RX ADMIN — POTASSIUM CHLORIDE 20 MEQ: 1.5 POWDER, FOR SOLUTION ORAL at 15:44

## 2024-02-22 RX ADMIN — SIMVASTATIN 20 MG: 20 TABLET, FILM COATED ORAL at 21:30

## 2024-02-22 RX ADMIN — HYDROMORPHONE HYDROCHLORIDE 0.5 MG: 1 INJECTION, SOLUTION INTRAMUSCULAR; INTRAVENOUS; SUBCUTANEOUS at 21:30

## 2024-02-22 RX ADMIN — FERROUS SULFATE TAB 325 MG (65 MG ELEMENTAL FE) 1 TABLET: 325 (65 FE) TAB at 21:30

## 2024-02-22 RX ADMIN — METOCLOPRAMIDE 10 MG: 10 TABLET ORAL at 21:30

## 2024-02-22 RX ADMIN — METOCLOPRAMIDE 10 MG: 5 INJECTION, SOLUTION INTRAMUSCULAR; INTRAVENOUS at 23:29

## 2024-02-22 ASSESSMENT — LIFESTYLE VARIABLES
EVER FELT BAD OR GUILTY ABOUT YOUR DRINKING: NO
HAVE YOU EVER FELT YOU SHOULD CUT DOWN ON YOUR DRINKING: NO
HAVE PEOPLE ANNOYED YOU BY CRITICIZING YOUR DRINKING: NO
EVER HAD A DRINK FIRST THING IN THE MORNING TO STEADY YOUR NERVES TO GET RID OF A HANGOVER: NO

## 2024-02-22 ASSESSMENT — COLUMBIA-SUICIDE SEVERITY RATING SCALE - C-SSRS
1. IN THE PAST MONTH, HAVE YOU WISHED YOU WERE DEAD OR WISHED YOU COULD GO TO SLEEP AND NOT WAKE UP?: NO
6. HAVE YOU EVER DONE ANYTHING, STARTED TO DO ANYTHING, OR PREPARED TO DO ANYTHING TO END YOUR LIFE?: NO
2. HAVE YOU ACTUALLY HAD ANY THOUGHTS OF KILLING YOURSELF?: NO

## 2024-02-22 ASSESSMENT — PAIN - FUNCTIONAL ASSESSMENT: PAIN_FUNCTIONAL_ASSESSMENT: 0-10

## 2024-02-22 ASSESSMENT — PAIN SCALES - GENERAL
PAINLEVEL_OUTOF10: 7
PAINLEVEL_OUTOF10: 8
PAINLEVEL_OUTOF10: 4

## 2024-02-22 NOTE — ED TRIAGE NOTES
Pt comes here from HD (was able to complete a full tx today)_ because her stomach Is distended. Has never had this happen before. No issues with BM. Makes very little urine

## 2024-02-22 NOTE — ED PROVIDER NOTES
History of Present Illness   CC: Abdominal Pain     History provided by: Patient  Limitations to History: None    HPI:  Rissa Bhakta is a 48 y.o. female with history of ESRD on Tuesday, Thursday, Saturday hemodialysis, last session today, hypertension, diabetes presenting to the emergency department with abdominal fullness.  Notes for the past 2 days she has felt large and of her abdomen with fullness, mild pain.  Denies any nausea, vomiting, fevers.  No she has had orthopnea and lower extremity swelling as well.  Denies any chest pain.  She only urinates occasionally every few days.  Denies any change in this.  Reports compliance with all home medications.    External Records Reviewed: Reviewed discharge summary from 10/9    Physical Exam   Triage vitals:  T 36.8 °C (98.2 °F)  HR 72  /80  RR 16  O2 97 %      Vital signs reviewed in nursing triage note, EMR flow sheets, and at patient's bedside.   General: Awake, alert, in no acute distress  Eyes: Gaze conjugate.  No scleral icterus or injection  HENT: Normo-cephalic, atraumatic. No stridor.  CV: Regular rate, Regular rhythm.  Left upper extremity AV fistula with palpable thrill, dressing intact after dialysis today.  Right upper extremity with diminished pulses, but dopplerable radial and ulnar pulse.  There is bilateral lower extremity pitting edema, approximately 2+ bilaterally, symmetric.  While sitting upright at 90 degrees, JVD is visible.  Resp: Breathing non-labored, speaking in full sentences.  Mild diffuse wheezing bilaterally with inspiration and expiration.  No rales or rhonchi is noted.  No  muscle use.  GI: full, tense, minimal diffuse tenderness. No rebound or guarding.  MSK/Extremities: No gross bony deformities. Moving all extremities  Skin: Warm. Appropriate color  Neuro: Alert. Oriented. Face symmetric. Speech is fluent.  Gross strength and sensation intact in b/l UE and LEs  Psych: Appropriate mood and affect    ED Course &  Medical Decision Making   ED Course:  ED Course as of 02/23/24 1451   Thu Feb 22, 2024   1430 ECG 12 lead  EKG obtained at 1400 demonstrating normal sinus rhythm with rate of 69, normal axis, normal intervals with borderline QT prolongation, no ST segment elevations or depressions.  No signs of ischemia.  There is a machine interpretation of acute STEMI which I do not agree with. [SH]   1431 CBC and Auto Differential(!)  Slight leukopenia, slight anemia, slight thrombocytopenia [SH]   1431 BLOOD GAS VENOUS FULL PANEL(!)  Normal pH, slight pCO2 elevation, potassium is not severely elevated, slightly low ionized calcium.  Lactate normal. [SH]   1456 Troponin I, High Sensitivity(!)  Mild troponin elevation [SH]   1526 Comprehensive metabolic panel(!)  Slight hypokalemia, BUN and creatinine elevated consistent with ESRD.  Mild hypocalcemia.  AST and ALT are within normal limits [SH]   1527 Bands %, Manual: 7.7  Patient does have 7.7% bands [SH]   1528 XR chest 2 views  Chest x-ray reviewed, no family interpreted, findings consistent with pulmonary edema and volume overload [SH]   1535 B-type natriuretic peptide(!)  BNP elevated [SH]   1637 ECG 12 lead  Repeat EKG obtained at 1635 demonstrate normal sinus rhythm with rate of 68, normal axis, prolonged QTc, otherwise normal intervals, no ST segment or acute signs of ischemia. [SH]   1722 Troponin I, High Sensitivity(!)  Trop downtrending [SH]   1850 CT abdomen pelvis w IV contrast  The abdomen pelvis with finding of ascites and anasarca [SH]      ED Course User Index  [SH] Campbell Manriquez MD         Diagnoses as of 02/23/24 1451   Hypervolemia, unspecified hypervolemia type   Troponin level elevated       Differential diagnoses considered include but are not limited to: Volume overload, new onset heart failure, pulmonary edema, cirrhosis, electrolyte abnormalities, bowel obstruction, ACS    Social Determinants Limiting Care: None identified    MDM:  48 y.o. female  presenting to the emergency department from dialysis after she received a full session, and has been dealing with abdominal fullness for the past few days.  On arrival vital signs within normal limits, patient nontoxic-appearing.  Exam concerning for possible volume overload as well as her abdominal fullness.  Suspect her abdominal fullness likely secondary volume overload.  Lower suspicion for cirrhosis given she has no history of this.  Will obtain labs as well as CT of the abdomen pelvis to look for possible bowel obstruction or cirrhosis morphology that would be indicative of the patient having another etiology for increased abdominal fullness.    Patient's workup revealed findings consistent with volume overload on both CT and laboratory evaluation.  Mild troponin elevation which I suspect is demand in nature due to her volume overload.  EKG is nonischemic.  Troponin was trended and was downtrending.  Patient administered aspirin for her troponin elevation.  Given her volume overload, feel that she would benefit from additional dialysis.  She does have mild hypokalemia and hypocalcemia which we are repleting.  Suspect she may need some changes in her diastole and fluid to account for her electrolyte abnormalities as well as her volume overload.  Patient will be admitted for further workup and management.  Discussed with EMS and admission coordinators who accepted her for admission.    Disposition   As a result of their workup, the patient will require admission to the hospital.  The patient was informed of their diagnosis.  Patient was given the opportunity to ask questions and answered them.  Patient agreed to be admitted to the hospital.    Procedures   Procedures    Patient seen and discussed with ED attending physician.    Goran Manriquez MD  PGY 3 Emergency Medicine         Campbell Manriquez MD  Resident  02/23/24 5684

## 2024-02-23 ENCOUNTER — APPOINTMENT (OUTPATIENT)
Dept: RADIOLOGY | Facility: HOSPITAL | Age: 49
End: 2024-02-23
Payer: COMMERCIAL

## 2024-02-23 ENCOUNTER — APPOINTMENT (OUTPATIENT)
Dept: CARDIOLOGY | Facility: HOSPITAL | Age: 49
End: 2024-02-23
Payer: COMMERCIAL

## 2024-02-23 ENCOUNTER — APPOINTMENT (OUTPATIENT)
Dept: DIALYSIS | Facility: HOSPITAL | Age: 49
End: 2024-02-23
Payer: COMMERCIAL

## 2024-02-23 LAB
ALBUMIN SERPL BCP-MCNC: 3.4 G/DL (ref 3.4–5)
ALP SERPL-CCNC: 59 U/L (ref 33–110)
ALT SERPL W P-5'-P-CCNC: <3 U/L (ref 7–45)
AMYLASE SERPL-CCNC: 13 U/L (ref 29–103)
ANION GAP BLDV CALCULATED.4IONS-SCNC: 8 MMOL/L (ref 10–25)
ANION GAP SERPL CALC-SCNC: 16 MMOL/L (ref 10–20)
ANION GAP SERPL CALC-SCNC: 18 MMOL/L (ref 10–20)
AORTIC VALVE MEAN GRADIENT: 8 MMHG
AORTIC VALVE PEAK VELOCITY: 2.07 M/S
AST SERPL W P-5'-P-CCNC: 12 U/L (ref 9–39)
AV PEAK GRADIENT: 17.1 MMHG
AVA (PEAK VEL): 2.34 CM2
AVA (VTI): 2.24 CM2
BASE EXCESS BLDV CALC-SCNC: 7.3 MMOL/L (ref -2–3)
BILIRUB SERPL-MCNC: 1.4 MG/DL (ref 0–1.2)
BODY TEMPERATURE: 37 DEGREES CELSIUS
BUN SERPL-MCNC: 24 MG/DL (ref 6–23)
BUN SERPL-MCNC: 26 MG/DL (ref 6–23)
CA-I BLDV-SCNC: 1.07 MMOL/L (ref 1.1–1.33)
CALCIUM SERPL-MCNC: 9.3 MG/DL (ref 8.6–10.6)
CALCIUM SERPL-MCNC: 9.7 MG/DL (ref 8.6–10.6)
CHLORIDE BLDV-SCNC: 98 MMOL/L (ref 98–107)
CHLORIDE SERPL-SCNC: 94 MMOL/L (ref 98–107)
CHLORIDE SERPL-SCNC: 94 MMOL/L (ref 98–107)
CO2 SERPL-SCNC: 29 MMOL/L (ref 21–32)
CO2 SERPL-SCNC: 31 MMOL/L (ref 21–32)
CREAT SERPL-MCNC: 6.36 MG/DL (ref 0.5–1.05)
CREAT SERPL-MCNC: 6.55 MG/DL (ref 0.5–1.05)
EGFRCR SERPLBLD CKD-EPI 2021: 7 ML/MIN/1.73M*2
EGFRCR SERPLBLD CKD-EPI 2021: 7 ML/MIN/1.73M*2
EJECTION FRACTION APICAL 4 CHAMBER: 44.5
EJECTION FRACTION: 49 %
ERYTHROCYTE [DISTWIDTH] IN BLOOD BY AUTOMATED COUNT: 17.4 % (ref 11.5–14.5)
ERYTHROCYTE [DISTWIDTH] IN BLOOD BY AUTOMATED COUNT: 17.7 % (ref 11.5–14.5)
ETHANOL SERPL-MCNC: <10 MG/DL
GLUCOSE BLD MANUAL STRIP-MCNC: 109 MG/DL (ref 74–99)
GLUCOSE BLD MANUAL STRIP-MCNC: 109 MG/DL (ref 74–99)
GLUCOSE BLD MANUAL STRIP-MCNC: 134 MG/DL (ref 74–99)
GLUCOSE BLD MANUAL STRIP-MCNC: 149 MG/DL (ref 74–99)
GLUCOSE BLD MANUAL STRIP-MCNC: 79 MG/DL (ref 74–99)
GLUCOSE BLDV-MCNC: 104 MG/DL (ref 74–99)
GLUCOSE SERPL-MCNC: 77 MG/DL (ref 74–99)
GLUCOSE SERPL-MCNC: 86 MG/DL (ref 74–99)
HAV IGM SER QL: NONREACTIVE
HBV CORE IGM SER QL: NONREACTIVE
HBV SURFACE AB SER-ACNC: 151 MIU/ML
HBV SURFACE AG SERPL QL IA: NONREACTIVE
HBV SURFACE AG SERPL QL IA: NONREACTIVE
HCO3 BLDV-SCNC: 33.9 MMOL/L (ref 22–26)
HCT VFR BLD AUTO: 36.4 % (ref 36–46)
HCT VFR BLD AUTO: 39.8 % (ref 36–46)
HCT VFR BLD EST: 37 % (ref 36–46)
HCV AB SER QL: NONREACTIVE
HGB BLD-MCNC: 11.2 G/DL (ref 12–16)
HGB BLD-MCNC: 11.9 G/DL (ref 12–16)
HGB BLDV-MCNC: 12.4 G/DL (ref 12–16)
INHALED O2 CONCENTRATION: 97 %
LACTATE BLDV-SCNC: 1.4 MMOL/L (ref 0.4–2)
LACTATE SERPL-SCNC: 0.9 MMOL/L (ref 0.4–2)
LEFT ATRIUM VOLUME AREA LENGTH INDEX BSA: 40.6 ML/M2
LEFT VENTRICLE INTERNAL DIMENSION DIASTOLE: 5.5 CM (ref 3.5–6)
LEFT VENTRICULAR OUTFLOW TRACT DIAMETER: 2 CM
LIPASE SERPL-CCNC: 28 U/L (ref 9–82)
MAGNESIUM SERPL-MCNC: 1.77 MG/DL (ref 1.6–2.4)
MCH RBC QN AUTO: 27.3 PG (ref 26–34)
MCH RBC QN AUTO: 27.7 PG (ref 26–34)
MCHC RBC AUTO-ENTMCNC: 29.9 G/DL (ref 32–36)
MCHC RBC AUTO-ENTMCNC: 30.8 G/DL (ref 32–36)
MCV RBC AUTO: 89 FL (ref 80–100)
MCV RBC AUTO: 93 FL (ref 80–100)
MITRAL VALVE E/A RATIO: 1.39
MITRAL VALVE E/E' RATIO: 14.13
NRBC BLD-RTO: 0 /100 WBCS (ref 0–0)
NRBC BLD-RTO: 0 /100 WBCS (ref 0–0)
OXYHGB MFR BLDV: 86.4 % (ref 45–75)
PCO2 BLDV: 56 MM HG (ref 41–51)
PH BLDV: 7.39 PH (ref 7.33–7.43)
PLATELET # BLD AUTO: 85 X10*3/UL (ref 150–450)
PLATELET # BLD AUTO: 88 X10*3/UL (ref 150–450)
PO2 BLDV: 63 MM HG (ref 35–45)
POTASSIUM BLDV-SCNC: 4.5 MMOL/L (ref 3.5–5.3)
POTASSIUM SERPL-SCNC: 3.7 MMOL/L (ref 3.5–5.3)
POTASSIUM SERPL-SCNC: 3.8 MMOL/L (ref 3.5–5.3)
PROT SERPL-MCNC: 8.2 G/DL (ref 6.4–8.2)
RBC # BLD AUTO: 4.1 X10*6/UL (ref 4–5.2)
RBC # BLD AUTO: 4.29 X10*6/UL (ref 4–5.2)
RIGHT VENTRICLE FREE WALL PEAK S': 8.81 CM/S
RIGHT VENTRICLE PEAK SYSTOLIC PRESSURE: 51.7 MMHG
SAO2 % BLDV: 89 % (ref 45–75)
SODIUM BLDV-SCNC: 135 MMOL/L (ref 136–145)
SODIUM SERPL-SCNC: 137 MMOL/L (ref 136–145)
SODIUM SERPL-SCNC: 137 MMOL/L (ref 136–145)
TRICUSPID ANNULAR PLANE SYSTOLIC EXCURSION: 1.6 CM
WBC # BLD AUTO: 3.4 X10*3/UL (ref 4.4–11.3)
WBC # BLD AUTO: 3.4 X10*3/UL (ref 4.4–11.3)

## 2024-02-23 PROCEDURE — 84132 ASSAY OF SERUM POTASSIUM: CPT | Performed by: STUDENT IN AN ORGANIZED HEALTH CARE EDUCATION/TRAINING PROGRAM

## 2024-02-23 PROCEDURE — 2500000002 HC RX 250 W HCPCS SELF ADMINISTERED DRUGS (ALT 637 FOR MEDICARE OP, ALT 636 FOR OP/ED): Mod: SE,MUE | Performed by: STUDENT IN AN ORGANIZED HEALTH CARE EDUCATION/TRAINING PROGRAM

## 2024-02-23 PROCEDURE — 99221 1ST HOSP IP/OBS SF/LOW 40: CPT | Performed by: INTERNAL MEDICINE

## 2024-02-23 PROCEDURE — 36415 COLL VENOUS BLD VENIPUNCTURE: CPT | Performed by: STUDENT IN AN ORGANIZED HEALTH CARE EDUCATION/TRAINING PROGRAM

## 2024-02-23 PROCEDURE — 2500000004 HC RX 250 GENERAL PHARMACY W/ HCPCS (ALT 636 FOR OP/ED): Mod: SE | Performed by: STUDENT IN AN ORGANIZED HEALTH CARE EDUCATION/TRAINING PROGRAM

## 2024-02-23 PROCEDURE — 83735 ASSAY OF MAGNESIUM: CPT

## 2024-02-23 PROCEDURE — 82947 ASSAY GLUCOSE BLOOD QUANT: CPT

## 2024-02-23 PROCEDURE — 87340 HEPATITIS B SURFACE AG IA: CPT | Performed by: INTERNAL MEDICINE

## 2024-02-23 PROCEDURE — 84075 ASSAY ALKALINE PHOSPHATASE: CPT

## 2024-02-23 PROCEDURE — 85027 COMPLETE CBC AUTOMATED: CPT | Performed by: STUDENT IN AN ORGANIZED HEALTH CARE EDUCATION/TRAINING PROGRAM

## 2024-02-23 PROCEDURE — 76705 ECHO EXAM OF ABDOMEN: CPT

## 2024-02-23 PROCEDURE — 93306 TTE W/DOPPLER COMPLETE: CPT

## 2024-02-23 PROCEDURE — 83605 ASSAY OF LACTIC ACID: CPT | Performed by: STUDENT IN AN ORGANIZED HEALTH CARE EDUCATION/TRAINING PROGRAM

## 2024-02-23 PROCEDURE — 2500000001 HC RX 250 WO HCPCS SELF ADMINISTERED DRUGS (ALT 637 FOR MEDICARE OP): Mod: SE | Performed by: STUDENT IN AN ORGANIZED HEALTH CARE EDUCATION/TRAINING PROGRAM

## 2024-02-23 PROCEDURE — G0378 HOSPITAL OBSERVATION PER HR: HCPCS

## 2024-02-23 PROCEDURE — 93005 ELECTROCARDIOGRAM TRACING: CPT

## 2024-02-23 PROCEDURE — 85027 COMPLETE CBC AUTOMATED: CPT

## 2024-02-23 PROCEDURE — 96376 TX/PRO/DX INJ SAME DRUG ADON: CPT | Mod: 59

## 2024-02-23 PROCEDURE — 93306 TTE W/DOPPLER COMPLETE: CPT | Performed by: INTERNAL MEDICINE

## 2024-02-23 PROCEDURE — 80307 DRUG TEST PRSMV CHEM ANLYZR: CPT | Performed by: STUDENT IN AN ORGANIZED HEALTH CARE EDUCATION/TRAINING PROGRAM

## 2024-02-23 PROCEDURE — 99233 SBSQ HOSP IP/OBS HIGH 50: CPT

## 2024-02-23 PROCEDURE — 82077 ASSAY SPEC XCP UR&BREATH IA: CPT | Performed by: STUDENT IN AN ORGANIZED HEALTH CARE EDUCATION/TRAINING PROGRAM

## 2024-02-23 PROCEDURE — 2500000004 HC RX 250 GENERAL PHARMACY W/ HCPCS (ALT 636 FOR OP/ED): Mod: SE,JZ | Performed by: STUDENT IN AN ORGANIZED HEALTH CARE EDUCATION/TRAINING PROGRAM

## 2024-02-23 PROCEDURE — 76705 ECHO EXAM OF ABDOMEN: CPT | Performed by: RADIOLOGY

## 2024-02-23 PROCEDURE — 90935 HEMODIALYSIS ONE EVALUATION: CPT | Performed by: INTERNAL MEDICINE

## 2024-02-23 PROCEDURE — 87340 HEPATITIS B SURFACE AG IA: CPT

## 2024-02-23 RX ORDER — DEXTROSE MONOHYDRATE 100 MG/ML
0.3 INJECTION, SOLUTION INTRAVENOUS ONCE AS NEEDED
Status: DISCONTINUED | OUTPATIENT
Start: 2024-02-23 | End: 2024-02-24 | Stop reason: HOSPADM

## 2024-02-23 RX ORDER — INSULIN LISPRO 100 [IU]/ML
0-5 INJECTION, SOLUTION INTRAVENOUS; SUBCUTANEOUS
Status: DISCONTINUED | OUTPATIENT
Start: 2024-02-23 | End: 2024-02-24 | Stop reason: HOSPADM

## 2024-02-23 RX ORDER — INSULIN GLARGINE 100 [IU]/ML
10 INJECTION, SOLUTION SUBCUTANEOUS NIGHTLY
Status: DISCONTINUED | OUTPATIENT
Start: 2024-02-23 | End: 2024-02-24 | Stop reason: HOSPADM

## 2024-02-23 RX ORDER — HYDROXYZINE HYDROCHLORIDE 25 MG/1
25 TABLET, FILM COATED ORAL ONCE
Status: COMPLETED | OUTPATIENT
Start: 2024-02-23 | End: 2024-02-23

## 2024-02-23 RX ORDER — DEXTROSE 50 % IN WATER (D50W) INTRAVENOUS SYRINGE
25
Status: DISCONTINUED | OUTPATIENT
Start: 2024-02-23 | End: 2024-02-24 | Stop reason: HOSPADM

## 2024-02-23 RX ORDER — LOPERAMIDE HYDROCHLORIDE 2 MG/1
2 CAPSULE ORAL 4 TIMES DAILY PRN
Status: DISCONTINUED | OUTPATIENT
Start: 2024-02-23 | End: 2024-02-24 | Stop reason: HOSPADM

## 2024-02-23 RX ORDER — INSULIN GLARGINE 100 [IU]/ML
20 INJECTION, SOLUTION SUBCUTANEOUS NIGHTLY
Status: DISCONTINUED | OUTPATIENT
Start: 2024-02-23 | End: 2024-02-23

## 2024-02-23 RX ORDER — ACETAMINOPHEN 500 MG
5 TABLET ORAL ONCE
Status: COMPLETED | OUTPATIENT
Start: 2024-02-23 | End: 2024-02-23

## 2024-02-23 RX ADMIN — CALCIUM ACETATE 2668 MG: 667 CAPSULE ORAL at 12:38

## 2024-02-23 RX ADMIN — ONDANSETRON 4 MG: 2 INJECTION INTRAMUSCULAR; INTRAVENOUS at 01:11

## 2024-02-23 RX ADMIN — ACETAMINOPHEN 1000 MG: 10 INJECTION INTRAVENOUS at 04:07

## 2024-02-23 RX ADMIN — ACETAMINOPHEN 1000 MG: 10 INJECTION INTRAVENOUS at 18:55

## 2024-02-23 RX ADMIN — CALCIUM ACETATE 2668 MG: 667 CAPSULE ORAL at 18:55

## 2024-02-23 RX ADMIN — FERROUS SULFATE TAB 325 MG (65 MG ELEMENTAL FE) 1 TABLET: 325 (65 FE) TAB at 09:04

## 2024-02-23 RX ADMIN — ONDANSETRON 4 MG: 2 INJECTION INTRAMUSCULAR; INTRAVENOUS at 07:59

## 2024-02-23 RX ADMIN — Medication 5 MG: at 23:35

## 2024-02-23 RX ADMIN — CALCIUM ACETATE 2668 MG: 667 CAPSULE ORAL at 09:04

## 2024-02-23 RX ADMIN — SIMVASTATIN 20 MG: 20 TABLET, FILM COATED ORAL at 20:02

## 2024-02-23 RX ADMIN — ASPIRIN 81 MG 81 MG: 81 TABLET ORAL at 09:06

## 2024-02-23 RX ADMIN — CARVEDILOL 25 MG: 25 TABLET, FILM COATED ORAL at 09:06

## 2024-02-23 RX ADMIN — FUROSEMIDE 20 MG: 20 TABLET ORAL at 09:06

## 2024-02-23 RX ADMIN — METOCLOPRAMIDE 10 MG: 10 TABLET ORAL at 20:02

## 2024-02-23 RX ADMIN — ACETAMINOPHEN 1000 MG: 10 INJECTION INTRAVENOUS at 22:32

## 2024-02-23 RX ADMIN — PHENOL 1 SPRAY: 1.4 SPRAY ORAL at 12:41

## 2024-02-23 RX ADMIN — PERFLUTREN 3 ML OF DILUTION: 6.52 INJECTION, SUSPENSION INTRAVENOUS at 16:43

## 2024-02-23 RX ADMIN — GABAPENTIN 100 MG: 100 CAPSULE ORAL at 09:06

## 2024-02-23 RX ADMIN — FERROUS SULFATE TAB 325 MG (65 MG ELEMENTAL FE) 1 TABLET: 325 (65 FE) TAB at 20:02

## 2024-02-23 RX ADMIN — LOPERAMIDE HYDROCHLORIDE 2 MG: 2 CAPSULE ORAL at 22:32

## 2024-02-23 RX ADMIN — ACETAMINOPHEN 1000 MG: 10 INJECTION INTRAVENOUS at 12:42

## 2024-02-23 RX ADMIN — HYDROCHLOROTHIAZIDE 12.5 MG: 25 TABLET ORAL at 09:05

## 2024-02-23 RX ADMIN — INSULIN GLARGINE 10 UNITS: 100 INJECTION, SOLUTION SUBCUTANEOUS at 20:03

## 2024-02-23 RX ADMIN — VALSARTAN 320 MG: 320 TABLET ORAL at 12:38

## 2024-02-23 RX ADMIN — AMLODIPINE BESYLATE 10 MG: 10 TABLET ORAL at 09:06

## 2024-02-23 RX ADMIN — HYDROXYZINE HYDROCHLORIDE 25 MG: 25 TABLET, FILM COATED ORAL at 23:35

## 2024-02-23 RX ADMIN — CARVEDILOL 25 MG: 25 TABLET, FILM COATED ORAL at 20:02

## 2024-02-23 SDOH — SOCIAL STABILITY: SOCIAL INSECURITY: DO YOU FEEL ANYONE HAS EXPLOITED OR TAKEN ADVANTAGE OF YOU FINANCIALLY OR OF YOUR PERSONAL PROPERTY?: NO

## 2024-02-23 SDOH — SOCIAL STABILITY: SOCIAL INSECURITY: DO YOU FEEL UNSAFE GOING BACK TO THE PLACE WHERE YOU ARE LIVING?: NO

## 2024-02-23 SDOH — SOCIAL STABILITY: SOCIAL INSECURITY: HAS ANYONE EVER THREATENED TO HURT YOUR FAMILY OR YOUR PETS?: NO

## 2024-02-23 SDOH — SOCIAL STABILITY: SOCIAL INSECURITY: DOES ANYONE TRY TO KEEP YOU FROM HAVING/CONTACTING OTHER FRIENDS OR DOING THINGS OUTSIDE YOUR HOME?: NO

## 2024-02-23 SDOH — SOCIAL STABILITY: SOCIAL INSECURITY: HAVE YOU HAD THOUGHTS OF HARMING ANYONE ELSE?: NO

## 2024-02-23 SDOH — SOCIAL STABILITY: SOCIAL INSECURITY: ABUSE: ADULT

## 2024-02-23 SDOH — SOCIAL STABILITY: SOCIAL INSECURITY: ARE THERE ANY APPARENT SIGNS OF INJURIES/BEHAVIORS THAT COULD BE RELATED TO ABUSE/NEGLECT?: NO

## 2024-02-23 SDOH — SOCIAL STABILITY: SOCIAL INSECURITY: ARE YOU OR HAVE YOU BEEN THREATENED OR ABUSED PHYSICALLY, EMOTIONALLY, OR SEXUALLY BY ANYONE?: NO

## 2024-02-23 ASSESSMENT — COGNITIVE AND FUNCTIONAL STATUS - GENERAL
MOBILITY SCORE: 24
PATIENT BASELINE BEDBOUND: NO
DAILY ACTIVITIY SCORE: 24

## 2024-02-23 ASSESSMENT — ACTIVITIES OF DAILY LIVING (ADL)
TOILETING: INDEPENDENT
ASSISTIVE_DEVICE: WALKER
HEARING - RIGHT EAR: FUNCTIONAL
LACK_OF_TRANSPORTATION: NO
WALKS IN HOME: INDEPENDENT
PATIENT'S MEMORY ADEQUATE TO SAFELY COMPLETE DAILY ACTIVITIES?: NO
DRESSING YOURSELF: INDEPENDENT
FEEDING YOURSELF: INDEPENDENT
BATHING: INDEPENDENT
GROOMING: INDEPENDENT
HEARING - LEFT EAR: FUNCTIONAL
ADEQUATE_TO_COMPLETE_ADL: NO
JUDGMENT_ADEQUATE_SAFELY_COMPLETE_DAILY_ACTIVITIES: NO

## 2024-02-23 ASSESSMENT — ENCOUNTER SYMPTOMS
WHEEZING: 1
COLOR CHANGE: 0
APPETITE CHANGE: 0
DIAPHORESIS: 0
COUGH: 1
MYALGIAS: 0
ACTIVITY CHANGE: 0
ABDOMINAL DISTENTION: 1
DIARRHEA: 1
ABDOMINAL PAIN: 1
FEVER: 0

## 2024-02-23 ASSESSMENT — LIFESTYLE VARIABLES
HOW MANY STANDARD DRINKS CONTAINING ALCOHOL DO YOU HAVE ON A TYPICAL DAY: PATIENT DECLINED
HOW OFTEN DO YOU HAVE A DRINK CONTAINING ALCOHOL: MONTHLY OR LESS
AUDIT-C TOTAL SCORE: -1
SUBSTANCE_ABUSE_PAST_12_MONTHS: NO
PRESCIPTION_ABUSE_PAST_12_MONTHS: NO
AUDIT-C TOTAL SCORE: -1
SKIP TO QUESTIONS 9-10: 0
HOW OFTEN DO YOU HAVE 6 OR MORE DRINKS ON ONE OCCASION: LESS THAN MONTHLY

## 2024-02-23 ASSESSMENT — PAIN - FUNCTIONAL ASSESSMENT
PAIN_FUNCTIONAL_ASSESSMENT: 0-10
PAIN_FUNCTIONAL_ASSESSMENT: 0-10
PAIN_FUNCTIONAL_ASSESSMENT: NO/DENIES PAIN
PAIN_FUNCTIONAL_ASSESSMENT: 0-10

## 2024-02-23 ASSESSMENT — PAIN DESCRIPTION - LOCATION
LOCATION: LEG
LOCATION: BACK

## 2024-02-23 ASSESSMENT — PAIN SCALES - GENERAL
PAINLEVEL_OUTOF10: 7
PAINLEVEL_OUTOF10: 0 - NO PAIN
PAINLEVEL_OUTOF10: 3

## 2024-02-23 ASSESSMENT — PATIENT HEALTH QUESTIONNAIRE - PHQ9
1. LITTLE INTEREST OR PLEASURE IN DOING THINGS: NOT AT ALL
2. FEELING DOWN, DEPRESSED OR HOPELESS: NOT AT ALL
SUM OF ALL RESPONSES TO PHQ9 QUESTIONS 1 & 2: 0

## 2024-02-23 NOTE — CONSULTS
CONSULT: NEPHROLOGY SERVICE    REASON FOR CONSULT: ESKD  Admit Date: 2/22/2024  1:25 PM       HPI: Patient is a 48 y.o. female admitted 2/22/2024 with h/o ESRD on HD, T2D, HTN, restless leg syndrome, anemia, HLD, and POTS who presents with 3 days of abdominal distension and pain, also LE edema, mild SOB and sore throat  CXR with cardiomegaly and vascular congestion, CT with large ascitis     [facility] [facility] [place] [place]Dr [facility]. Last HD 2/22/24 with post weight 94.4, . DW 95; Left FA AVF  Mircera 150 mcg Q 2 weeks last dose given on 2/10/24.     Past Medical History:   Diagnosis Date    Anemia associated with chronic renal failure     Diabetes mellitus (CMS/HCC)     ESRD (end stage renal disease) on dialysis (CMS/[facility])     Hyperlipidemia     Hypertension     Insomnia     POTS (postural orthostatic tachycardia syndrome)     Restless leg syndrome      Allergies: Cherry, Coconut flavor, Morphine hcl, Mushroom, and [place]     Past Surgical History:   Procedure Laterality Date    AV FISTULA PLACEMENT Left        Family History   Problem Relation Name Age of Onset    No Known Problems Mother      No Known Problems Father      No Known Problems Sister      No Known Problems Brother      No Known Problems Daughter      No Known Problems Son         Social History  She reports that she has never smoked. She has never used smokeless tobacco. She reports that she does not currently use alcohol after a past usage of about 1.0 standard drink of alcohol per week. She reports that she does not use drugs.    Review of Systems  As above    CURRENT HOSP MEDS:    Current Facility-Administered Medications:     acetaminophen (Ofirmev) injection 1,000 mg, 1,000 mg, intravenous, q6h [facility], Amish Lozano MD, Stopped at 02/23/24 0422    amLODIPine (Norvasc) tablet 10 mg, 10 mg, oral, Daily, Amish Lozano MD, 10 mg at 02/23/24 0906    aspirin chewable tablet 81 mg, 81 mg, oral, Daily, Amish Lozano MD, 81 mg at 02/23/24 0906     calcium acetate (Phoslo) capsule 2,668 mg, 2,668 mg, oral, TID with meals, Amish Lozano MD, 2,668 mg at 02/23/24 0904    carvedilol (Coreg) tablet 25 mg, 25 mg, oral, BID, Amish Lozano MD, 25 mg at 02/23/24 0906    dextrose 10 % in water (D10W) infusion, 0.3 g/kg/hr, intravenous, Once PRN, Amish Lozano MD    dextrose 50 % injection 25 g, 25 g, intravenous, q15 min PRN, Amish Lozano MD    ferrous sulfate (325 mg ferrous sulfate) tablet 1 tablet, 65 mg of iron, oral, BID, Amish Lozano MD, 1 tablet at 02/23/24 0904    furosemide (Lasix) tablet 20 mg, 20 mg, oral, Daily, Amish Lozano MD, 20 mg at 02/23/24 0906    gabapentin (Neurontin) capsule 100 mg, 100 mg, oral, Once per day on Mon Wed Fri, Amish Lozano MD, 100 mg at 02/23/24 0906    glucagon (Glucagen) injection 1 mg, 1 mg, intramuscular, q15 min PRN, Amish Lozano MD    hydroCHLOROthiazide (HYDRODiuril) tablet 12.5 mg, 12.5 mg, oral, Daily, Amish Lozano MD, 12.5 mg at 02/23/24 0905    hydrOXYzine HCL (Atarax) tablet 10 mg, 10 mg, oral, q8h PRN, Amish Lozano MD    insulin glargine (Lantus) injection 10 Units, 10 Units, subcutaneous, Nightly, Amish Lozano MD    insulin lispro (HumaLOG) injection 0-5 Units, 0-5 Units, subcutaneous, TID with meals, Amish Lzoano MD    metoclopramide (Reglan) tablet 10 mg, 10 mg, oral, BID, Amish Lozano MD, 10 mg at 02/22/24 2130    ondansetron (Zofran) injection 4 mg, 4 mg, intravenous, q6h PRN, Ike Rodríguez MD, 4 mg at 02/23/24 7569    phenoL (Chloraseptic) 1.4 % mouth/throat spray 1 spray, 1 spray, Mouth/Throat, q2h PRN, Arun Craft MD    rOPINIRole (Requip) tablet 1 mg, 1 mg, oral, Nightly, Amish Lozano MD    simvastatin (Zocor) tablet 20 mg, 20 mg, oral, Nightly, Amish Lozano MD, 20 mg at 02/22/24 2130    valsartan (Diovan) tablet 320 mg, 320 mg, oral, Daily, Amish Lozano MD     PHYSICAL EXAM:  BP  "170/83 (BP Location: Right arm, Patient Position: Lying)   Pulse 72   Temp 36.5 °C (97.7 °F) (Temporal)   Resp 16   Ht 1.626 m (5' 4\")   Wt 92.6 kg (204 lb 3.2 oz)   SpO2 98%   BMI 35.05 kg/m²     Intake/Output Summary (Last 24 hours) at 2/23/2024 1134  Last data filed at 2/23/2024 1100  Gross per 24 hour   Intake 200 ml   Output --   Net 200 ml     Gen: AAO, NAD  Neck: No JVD  Cardiac: RRR  Resp: decrease BS  Abd: Soft, non tender, +BS, non distended   Large ascitis  Ext: +1 edema LE  Access: LUE AVF  Neuro: moves 4 ext  Peripheral Pulses: Capillary refill <2secs, strong peripheral pulses.  Skin: Skin color, texture, turgor normal, no suspicious rashes or lesions.    LABS:   Results for orders placed or performed during the hospital encounter of 02/22/24 (from the past 24 hour(s))   CBC and Auto Differential   Result Value Ref Range    WBC 2.6 (L) 4.4 - 11.3 x10*3/uL    nRBC 0.0 0.0 - 0.0 /100 WBCs    RBC 4.10 4.00 - 5.20 x10*6/uL    Hemoglobin 11.4 (L) 12.0 - 16.0 g/dL    Hematocrit 35.1 (L) 36.0 - 46.0 %    MCV 86 80 - 100 fL    MCH 27.8 26.0 - 34.0 pg    MCHC 32.5 32.0 - 36.0 g/dL    RDW 17.4 (H) 11.5 - 14.5 %    Platelets 85 (L) 150 - 450 x10*3/uL    Immature Granulocytes %, Automated 0.4 0.0 - 0.9 %    Immature Granulocytes Absolute, Automated 0.01 0.00 - 0.70 x10*3/uL   Comprehensive metabolic panel   Result Value Ref Range    Glucose 74 74 - 99 mg/dL    Sodium 137 136 - 145 mmol/L    Potassium 3.4 (L) 3.5 - 5.3 mmol/L    Chloride 94 (L) 98 - 107 mmol/L    Bicarbonate 30 21 - 32 mmol/L    Anion Gap 16 10 - 20 mmol/L    Urea Nitrogen 20 6 - 23 mg/dL    Creatinine 5.97 (H) 0.50 - 1.05 mg/dL    eGFR 8 (L) >60 mL/min/1.73m*2    Calcium 8.4 (L) 8.6 - 10.6 mg/dL    Albumin 3.6 3.4 - 5.0 g/dL    Alkaline Phosphatase 61 33 - 110 U/L    Total Protein 9.1 (H) 6.4 - 8.2 g/dL    AST 18 9 - 39 U/L    Bilirubin, Total 1.2 0.0 - 1.2 mg/dL    ALT 7 7 - 45 U/L   Manual Differential   Result Value Ref Range    " Neutrophils %, Manual 55.2 40.0 - 80.0 %    Bands %, Manual 7.7 0.0 - 5.0 %    Lymphocytes %, Manual 27.6 13.0 - 44.0 %    Monocytes %, Manual 0.9 2.0 - 10.0 %    Eosinophils %, Manual 0.8 0.0 - 6.0 %    Basophils %, Manual 2.6 0.0 - 2.0 %    Atypical Lymphocytes %, Manual 5.2 0.0 - 2.0 %    Seg Neutrophils Absolute, Manual 1.44 1.20 - 7.00 x10*3/uL    Bands Absolute, Manual 0.20 0.00 - 0.70 x10*3/uL    Lymphocytes Absolute, Manual 0.72 (L) 1.20 - 4.80 x10*3/uL    Monocytes Absolute, Manual 0.02 (L) 0.10 - 1.00 x10*3/uL    Eosinophils Absolute, Manual 0.02 0.00 - 0.70 x10*3/uL    Basophils Absolute, Manual 0.07 0.00 - 0.10 x10*3/uL    Atypical Lymphs Absolute, Manual 0.14 0.00 - 0.50 x10*3/uL    Total Cells Counted 116     Neutrophils Absolute, Manual 1.64 1.20 - 7.70 x10*3/uL    RBC Morphology See Below     Ovalocytes Few     [place] Cells Few     Acanthocytes Few    Amylase   Result Value Ref Range    Amylase 13 (L) 29 - 103 U/L   Lipase   Result Value Ref Range    Lipase 28 9 - 82 U/L   BLOOD GAS VENOUS FULL PANEL   Result Value Ref Range    POCT pH, Venous 7.43 7.33 - 7.43 pH    POCT pCO2, Venous 52 (H) 41 - 51 mm Hg    POCT pO2, Venous 34 (L) 35 - 45 mm Hg    POCT SO2, Venous 46 45 - 75 %    POCT Oxy Hemoglobin, Venous 45.5 45.0 - 75.0 %    POCT Hematocrit Calculated, Venous 35.0 (L) 36.0 - 46.0 %    POCT Sodium, Venous 135 (L) 136 - 145 mmol/L    POCT Potassium, Venous 3.4 (L) 3.5 - 5.3 mmol/L    POCT Chloride, Venous 97 (L) 98 - 107 mmol/L    POCT Ionized Calicum, Venous 0.95 (L) 1.10 - 1.33 mmol/L    POCT Glucose, Venous 84 74 - 99 mg/dL    POCT Lactate, Venous 1.1 0.4 - 2.0 mmol/L    POCT Base Excess, Venous 8.7 (H) -2.0 - 3.0 mmol/L    POCT HCO3 Calculated, Venous 34.5 (H) 22.0 - 26.0 mmol/L    POCT Hemoglobin, Venous 11.7 (L) 12.0 - 16.0 g/dL    POCT Anion Gap, Venous 7.0 (L) 10.0 - 25.0 mmol/L    Patient Temperature 37.0 degrees Celsius    FiO2 21 %   Troponin I, High Sensitivity   Result Value Ref Range     Troponin I, High Sensitivity 50 (H) 0 - 34 ng/L   B-type natriuretic peptide   Result Value Ref Range     (H) 0 - 99 pg/mL   Troponin I, High Sensitivity   Result Value Ref Range    Troponin I, High Sensitivity 41 (H) 0 - 34 ng/L   Hepatitis B surface antibody   Result Value Ref Range    Hepatitis B Surface .0 (H) <10.0 mIU/mL   ECG 12 lead   Result Value Ref Range    Ventricular Rate 69 BPM    Atrial Rate 69 BPM    FL Interval 180 ms    QRS Duration 96 ms    QT Interval 460 ms    QTC Calculation(Bazett) 492 ms    P Axis 39 degrees    R Axis 40 degrees    T Axis -19 degrees    QRS Count 11 beats    Q Onset 211 ms    P Onset 121 ms    P Offset 191 ms    T Offset 441 ms    QTC Fredericia 482 ms   ECG 12 lead   Result Value Ref Range    Ventricular Rate 68 BPM    Atrial Rate 68 BPM    FL Interval 182 ms    QRS Duration 96 ms    QT Interval 472 ms    QTC Calculation(Bazett) 501 ms    P Axis 44 degrees    R Axis 54 degrees    T Axis 25 degrees    QRS Count 11 beats    Q Onset 212 ms    P Onset 121 ms    P Offset 190 ms    T Offset 448 ms    QTC Fredericia 492 ms   Coagulation Screen   Result Value Ref Range    Protime 13.5 (H) 9.8 - 12.8 seconds    INR 1.2 (H) 0.9 - 1.1    aPTT 37 27 - 38 seconds   POCT GLUCOSE   Result Value Ref Range    POCT Glucose 134 (H) 74 - 99 mg/dL   BLOOD GAS VENOUS FULL PANEL   Result Value Ref Range    POCT pH, Venous 7.39 7.33 - 7.43 pH    POCT pCO2, Venous 56 (H) 41 - 51 mm Hg    POCT pO2, Venous 63 (H) 35 - 45 mm Hg    POCT SO2, Venous 89 (H) 45 - 75 %    POCT Oxy Hemoglobin, Venous 86.4 (H) 45.0 - 75.0 %    POCT Hematocrit Calculated, Venous 37.0 36.0 - 46.0 %    POCT Sodium, Venous 135 (L) 136 - 145 mmol/L    POCT Potassium, Venous 4.5 3.5 - 5.3 mmol/L    POCT Chloride, Venous 98 98 - 107 mmol/L    POCT Ionized Calicum, Venous 1.07 (L) 1.10 - 1.33 mmol/L    POCT Glucose, Venous 104 (H) 74 - 99 mg/dL    POCT Lactate, Venous 1.4 0.4 - 2.0 mmol/L    POCT Base Excess, Venous 7.3  (H) -2.0 - 3.0 mmol/L    POCT HCO3 Calculated, Venous 33.9 (H) 22.0 - 26.0 mmol/L    POCT Hemoglobin, Venous 12.4 12.0 - 16.0 g/dL    POCT Anion Gap, Venous 8.0 (L) 10.0 - 25.0 mmol/L    Patient Temperature 37.0 degrees Celsius    FiO2 97 %   CBC   Result Value Ref Range    WBC 3.4 (L) 4.4 - 11.3 x10*3/uL    nRBC 0.0 0.0 - 0.0 /100 WBCs    RBC 4.10 4.00 - 5.20 x10*6/uL    Hemoglobin 11.2 (L) 12.0 - 16.0 g/dL    Hematocrit 36.4 36.0 - 46.0 %    MCV 89 80 - 100 fL    MCH 27.3 26.0 - 34.0 pg    MCHC 30.8 (L) 32.0 - 36.0 g/dL    RDW 17.4 (H) 11.5 - 14.5 %    Platelets 88 (L) 150 - 450 x10*3/uL   Basic metabolic panel   Result Value Ref Range    Glucose 77 74 - 99 mg/dL    Sodium 137 136 - 145 mmol/L    Potassium 3.7 3.5 - 5.3 mmol/L    Chloride 94 (L) 98 - 107 mmol/L    Bicarbonate 29 21 - 32 mmol/L    Anion Gap 18 10 - 20 mmol/L    Urea Nitrogen 24 (H) 6 - 23 mg/dL    Creatinine 6.55 (H) 0.50 - 1.05 mg/dL    eGFR 7 (L) >60 mL/min/1.73m*2    Calcium 9.3 8.6 - 10.6 mg/dL   Lactate   Result Value Ref Range    Lactate 0.9 0.4 - 2.0 mmol/L   Ethanol   Result Value Ref Range    Alcohol <10 <=10 mg/dL   POCT GLUCOSE   Result Value Ref Range    POCT Glucose 79 74 - 99 mg/dL   Hepatitis B surface antigen   Result Value Ref Range    Hepatitis B Surface AG Nonreactive Nonreactive       DATA:   Diagnostic tests reviewed for today's visit:    Labs and meds    ASSESSMENT AND PLAN:  - ESKD on  HD: hypervolemic on exam, seen on IUF session this am tolerating well  Will also plan in doing routine IHD tomorrow   HTN: suboptimal control, resume usual BP meds  Lytes acceptable   BMD: on phoslo   Hb 11.2    Greatly appreciate the opportunity to assist in the care of this patient. Will continue to follow.     Signature: Ramos Johnson MD  Division of Nephrology and Hypertension

## 2024-02-23 NOTE — CARE PLAN
Problem: Pain  Goal: My pain/discomfort is manageable  Outcome: Met     Problem: Safety  Goal: Patient will be injury free during hospitalization  Outcome: Met  Goal: I will remain free of falls  Outcome: Met     Problem: Daily Care  Goal: Daily care needs are met  Outcome: Met     Problem: Psychosocial Needs  Goal: Demonstrates ability to cope with hospitalization/illness  Outcome: Met  Goal: Collaborate with me, my family, and caregiver to identify my specific goals  Outcome: Met     Problem: Discharge Barriers  Goal: My discharge needs are met  Outcome: Progressing   The patient's goals for the shift include      The clinical goals for the shift include Rissa will remain neurologically intact throughout the shift.    Over the shift, the patient did not make progress toward the following goals. Barriers to progression include waiting on liver U/S. Recommendations to address these barriers include U/S today.

## 2024-02-23 NOTE — PROGRESS NOTES
I met with Joy at the bedside regarding discharge planning and home going needs. Patient lives at home with her two dependent teenagers and she is usually independent with ADL's she does have a rollator in the home. Patient is a Tues- Thurs- Sat Hemodialysis patient at [facility] she has left arm fistula. Patient is not medically cleared for discharge. I will continue to follow with a safe discharge plan.

## 2024-02-23 NOTE — H&P
History Of Present Illness  Rissa Bhakta is a 48 y.o. female presenting with ESRD on HD (TTS), DMII (A1c of 5.6 10/2023), HTN, restless leg syndrome, anemia, HLD, and POTS who presents with 3 days of abdominal distension. Per patient 3 days ago she felt uncomfortable when laying on her abdomen and the pain has gotten worse. Patient endorses a stretching sensation in her abdomen. Also endorses SOB at baseline but states that it has been worse over the last 3 days. Endorses inability to lay flat. Denies CP.  Patient also endorses worsening leg swelling. Per patient the leg swelling usually improves or resolves with dialysis but recently it has not been improving. Patient endorses intermittently missed dialysis sessions. Last session was earlier today. Last missed session was two days ago. Never misses 2 sessions in 1 week. Patient makes urine.  Denies n/v. Endorses long standing hx of explosive diarrhea that occurs every 3 days. Endorses alcohol use; one drink every 6 months. Denies IV drug use and tobacco use. Endorses 1 day of cough and wheezing.      Past Medical History  Past Medical History:   Diagnosis Date    Anemia associated with chronic renal failure     Diabetes mellitus (CMS/HCC)     ESRD (end stage renal disease) on dialysis (CMS/Formerly [facility])     Hyperlipidemia     Hypertension     Insomnia     POTS (postural orthostatic tachycardia syndrome)     Restless leg syndrome        Surgical History  Past Surgical History:   Procedure Laterality Date    AV FISTULA PLACEMENT Left         Social History  She reports that she has never smoked. She has never used smokeless tobacco. She reports that she does not currently use alcohol after a past usage of about 1.0 standard drink of alcohol per week. She reports that she does not use drugs.    Family History  Family History   Problem Relation Name Age of Onset    No Known Problems Mother      No Known Problems Father      No Known Problems Sister      No Known Problems  "Brother      No Known Problems Daughter      No Known Problems Son          Allergies  Cherry, Coconut flavor, Morphine hcl, Mushroom, and Kellogg    Review of Systems   Constitutional:  Negative for activity change, appetite change, diaphoresis and fever.   HENT:  Positive for congestion.    Respiratory:  Positive for cough and wheezing.    Gastrointestinal:  Positive for abdominal distention, abdominal pain and diarrhea.   Musculoskeletal:  Negative for myalgias.   Skin:  Negative for color change.   Allergic/Immunologic: Positive for food allergies.        Physical Exam  Vitals reviewed.   Constitutional:       Appearance: Normal appearance.   HENT:      Head: Normocephalic and atraumatic.      Mouth/Throat:      Mouth: Mucous membranes are moist.   Eyes:      Pupils: Pupils are equal, round, and reactive to light.   Cardiovascular:      Rate and Rhythm: Normal rate and regular rhythm.   Pulmonary:      Breath sounds: Wheezing present.      Comments: Crackles in R. Lower lung field   Musculoskeletal:      Cervical back: Normal range of motion.   Neurological:      Mental Status: She is alert.        Last Recorded Vitals  Blood pressure (!) 175/92, pulse 96, temperature 36.5 °C (97.7 °F), temperature source Temporal, resp. rate 16, height 1.626 m (5' 4\"), weight 97.1 kg (214 lb), SpO2 98 %.           Assessment/Plan   Principal Problem:    Fluid overload  Rissa Bhakta is a 48 y.o. female presenting with ESRD on HD (TTS), DMII (A1c of 5.6 10/2023), HTN, restless leg syndrome, anemia, HLD, and POTS who presents with 3 days of abdominal distension. Patient with hx of intermittently missed dialysis sessions. Nephrology consult for evaluation of fluid status and possible additional dialysis sessions. Patient still makes urine. Will give diuretics to see if fluid status improves. Patient with orthopnea, worsening SOB, cardiomegaly on imaging and elevated BNP. BNP most likely elevated due to ESRD, CHF still " important to r/o. Echo pending. AST and ALT normal. AST/ALT ratio>2 which can be suggestive of alcoholic liver disease. Hepatomegaly noted on CT abd pelvis. Patient without a hx of alcohol abuse and also with normal albumin. RUQ ultrasound and coags ordered to further evaluate liver function. Patient denied nausea and vomiting during interview but had 2 large episodes of emesis. No signs of obstruction on CT abd/pelvis. Patient is HDS and appropriate for floor level care. Detailed assessment and plan below.     Ascites  - ddx: fluid overload secondary to missed dialysis vs heart failure vs liver disease   - hx of ESRD on HD and missed dialysis sessions   - patient with dialysis today; dry weight unclear   [ ] nephrology consult for possible additional fluid removal  - CXR with cardiomegaly and mild pulmonary vascular congestion; patient also with worsening of SOB  - BNP of 805; most likely elevated due to renal disease  [ ] echo pending for evaluation of possible heart failure   - patient without hx of liver disease  - normal AST and ALT although AST/ALT ratio >2, ALP and albumin wnl  - hepatomegaly noted on CT  - possibly there is mild fatty liver; unlikely to cause the amount of ascites seen  [ ] RUQ u/s and Coags to assess liver function pending  - patient without signs of bowel obstruction on imaging  - obstruction unlikely as patient is tolerating a diet   - patient makes urine; bumex 1 mg given  [ ] will monitor urine output  - daily weights, strict I's & O's ordered     DMII (A1c of 5.6 10/2023)  - glucose on arrival   - home regimen of lantus 20 units nightly; endorses intermittent compliance  - endorses several hypoglycemic episodes  - lantus 10 units and sliding scale ordered    HTN  - home felodipine not on formulary; amlodipine 10 mg ordered  - c/w home valsartan-hydrochlorothiazide 320-12.5 mg and  carvedilol 25 mg BID    ESRD on HD (TTS)  [ ] nephrology consult pending   - c/w calcium acetate 2,669 mg  TID, lasix 20 mg daily   - renally dose meds   - avoid nephrotoxic agents (patient still produces urine)      F as needed   E replete as needed   N renal   GI not indicated   DVT ppx: lovenox     Discussed with Dr. Stevan Lozano MD  Family Medicine, PGY-3

## 2024-02-23 NOTE — NURSING NOTE
Report to Receiving RN:    Report To: MANI Mix  Time Report Called: 12:01  Hand-Off Communication: No acute change from RN to RN report.  Patient tolerated treatment well with 1.4L fluid removed.  Last /74, HR 63.  No complaint of pain or discomfort.    Complications During Treatment: Yes, clotted off system 29 minutes before treatment ended.  Blood returned to patient.  No other complaint.   Ultrafiltration Treatment: Yes, 1.4L  Medications Administered During Dialysis: No  Blood Products Administered During Dialysis: No  Labs Sent During Dialysis: No  Heparin Drip Rate Changes: No    Electronic Signatures:   (Signed 2/23/24)   Authored: Salvador Portillo RN     Last Updated: 12:03 PM by SALVADOR PORTILLO

## 2024-02-23 NOTE — PROGRESS NOTES
"Rissa Bhakta is a 48 y.o. female on day 0 of admission presenting with Fluid overload.    Subjective   No ONE.    Pt reports four episodes of unprovoked emesis overnight, brown in color. Pt reports continued nausea this morning and overall discomfort. Pt endorses increased urination since administration of IV lasix yesterday but denies appreciable changes in her abdominal distension or leg swelling. Pt denies CP, SOB, abd pain, diarrhea.     Objective     Physical Exam  Vitals reviewed.   Constitutional:       General: She is not in acute distress.  HENT:      Head: Normocephalic and atraumatic.   Eyes:      Extraocular Movements: Extraocular movements intact.      Conjunctiva/sclera: Conjunctivae normal.   Cardiovascular:      Rate and Rhythm: Normal rate and regular rhythm.   Pulmonary:      Effort: Pulmonary effort is normal.      Breath sounds: Rales (bibasilar) present.   Abdominal:      General: Abdomen is protuberant. There is distension.      Tenderness: There is no abdominal tenderness. There is no guarding.   Musculoskeletal:         General: No tenderness. Normal range of motion.      Right lower le+ Edema present.      Left lower le+ Edema present.   Skin:     General: Skin is warm and dry.   Neurological:      General: No focal deficit present.      Mental Status: She is alert and oriented to person, place, and time.   Psychiatric:         Mood and Affect: Mood normal.         Behavior: Behavior normal.       Last Recorded Vitals  Blood pressure 147/79, pulse 64, temperature 36.5 °C (97.7 °F), temperature source Temporal, resp. rate 16, height 1.626 m (5' 4\"), weight 92.6 kg (204 lb 3.2 oz), SpO2 98 %.  Intake/Output last 3 Shifts:  No intake/output data recorded.    Relevant Results  Results for orders placed or performed during the hospital encounter of 24 (from the past 24 hour(s))   Troponin I, High Sensitivity   Result Value Ref Range    Troponin I, High Sensitivity 41 (H) 0 - 34 " ng/L   Hepatitis B surface antibody   Result Value Ref Range    Hepatitis B Surface .0 (H) <10.0 mIU/mL   ECG 12 lead   Result Value Ref Range    Ventricular Rate 69 BPM    Atrial Rate 69 BPM    UT Interval 180 ms    QRS Duration 96 ms    QT Interval 460 ms    QTC Calculation(Bazett) 492 ms    P Axis 39 degrees    R Axis 40 degrees    T Axis -19 degrees    QRS Count 11 beats    Q Onset 211 ms    P Onset 121 ms    P Offset 191 ms    T Offset 441 ms    QTC Fredericia 482 ms   ECG 12 lead   Result Value Ref Range    Ventricular Rate 68 BPM    Atrial Rate 68 BPM    UT Interval 182 ms    QRS Duration 96 ms    QT Interval 472 ms    QTC Calculation(Bazett) 501 ms    P Axis 44 degrees    R Axis 54 degrees    T Axis 25 degrees    QRS Count 11 beats    Q Onset 212 ms    P Onset 121 ms    P Offset 190 ms    T Offset 448 ms    QTC Fredericia 492 ms   Coagulation Screen   Result Value Ref Range    Protime 13.5 (H) 9.8 - 12.8 seconds    INR 1.2 (H) 0.9 - 1.1    aPTT 37 27 - 38 seconds   POCT GLUCOSE   Result Value Ref Range    POCT Glucose 134 (H) 74 - 99 mg/dL   BLOOD GAS VENOUS FULL PANEL   Result Value Ref Range    POCT pH, Venous 7.39 7.33 - 7.43 pH    POCT pCO2, Venous 56 (H) 41 - 51 mm Hg    POCT pO2, Venous 63 (H) 35 - 45 mm Hg    POCT SO2, Venous 89 (H) 45 - 75 %    POCT Oxy Hemoglobin, Venous 86.4 (H) 45.0 - 75.0 %    POCT Hematocrit Calculated, Venous 37.0 36.0 - 46.0 %    POCT Sodium, Venous 135 (L) 136 - 145 mmol/L    POCT Potassium, Venous 4.5 3.5 - 5.3 mmol/L    POCT Chloride, Venous 98 98 - 107 mmol/L    POCT Ionized Calicum, Venous 1.07 (L) 1.10 - 1.33 mmol/L    POCT Glucose, Venous 104 (H) 74 - 99 mg/dL    POCT Lactate, Venous 1.4 0.4 - 2.0 mmol/L    POCT Base Excess, Venous 7.3 (H) -2.0 - 3.0 mmol/L    POCT HCO3 Calculated, Venous 33.9 (H) 22.0 - 26.0 mmol/L    POCT Hemoglobin, Venous 12.4 12.0 - 16.0 g/dL    POCT Anion Gap, Venous 8.0 (L) 10.0 - 25.0 mmol/L    Patient Temperature 37.0 degrees Celsius     FiO2 97 %   CBC   Result Value Ref Range    WBC 3.4 (L) 4.4 - 11.3 x10*3/uL    nRBC 0.0 0.0 - 0.0 /100 WBCs    RBC 4.10 4.00 - 5.20 x10*6/uL    Hemoglobin 11.2 (L) 12.0 - 16.0 g/dL    Hematocrit 36.4 36.0 - 46.0 %    MCV 89 80 - 100 fL    MCH 27.3 26.0 - 34.0 pg    MCHC 30.8 (L) 32.0 - 36.0 g/dL    RDW 17.4 (H) 11.5 - 14.5 %    Platelets 88 (L) 150 - 450 x10*3/uL   Basic metabolic panel   Result Value Ref Range    Glucose 77 74 - 99 mg/dL    Sodium 137 136 - 145 mmol/L    Potassium 3.7 3.5 - 5.3 mmol/L    Chloride 94 (L) 98 - 107 mmol/L    Bicarbonate 29 21 - 32 mmol/L    Anion Gap 18 10 - 20 mmol/L    Urea Nitrogen 24 (H) 6 - 23 mg/dL    Creatinine 6.55 (H) 0.50 - 1.05 mg/dL    eGFR 7 (L) >60 mL/min/1.73m*2    Calcium 9.3 8.6 - 10.6 mg/dL   Lactate   Result Value Ref Range    Lactate 0.9 0.4 - 2.0 mmol/L   Ethanol   Result Value Ref Range    Alcohol <10 <=10 mg/dL   POCT GLUCOSE   Result Value Ref Range    POCT Glucose 79 74 - 99 mg/dL   Hepatitis B surface antigen   Result Value Ref Range    Hepatitis B Surface AG Nonreactive Nonreactive   POCT GLUCOSE   Result Value Ref Range    POCT Glucose 109 (H) 74 - 99 mg/dL       Assessment/Plan   Principal Problem:    Fluid overload    Rissa Bhakta is a 48 y.o. female presenting with ESRD on HD (TTS), DMII (A1c of 5.6 10/2023), HTN, restless leg syndrome, anemia, HLD, and POTS who presents with 3 days of abdominal distension. Patient with hx of intermittently missed dialysis sessions. Patient with orthopnea, worsening SOB, cardiomegaly on imaging and elevated BNP. BNP most likely elevated due to ESRD, CHF still important to r/o. Echo pending. AST and ALT normal. Hepatomegaly noted on CT abd pelvis. Patient without a hx of alcohol abuse and also with normal albumin. RUQ ultrasound and coags ordered to further evaluate liver function. No signs of obstruction on CT abd/pelvis. Patient is HDS and appropriate for floor level care. Detailed assessment and plan below.      2/23:   - 1.4L removed during extra HD session today; resuming normal TTS schedule  - HBV surface antibody positive, surface antigen negative; hepatitis panel ordered  - RUQ US + TTE pending to assess for liver pathology, HF (NPO now for RUQ US)     #Ascites  - ddx: fluid overload secondary to missed dialysis vs heart failure vs liver disease   - hx of ESRD on HD and missed dialysis sessions   - CXR with cardiomegaly and mild pulmonary vascular congestion; patient also with worsening of SOB  - BNP of 805; most likely elevated due to renal disease  - patient without hx of liver disease  - normal AST and ALT although AST/ALT ratio >2, ALP and albumin wnl  - coags WNL  - hepatomegaly noted on CT  - patient without signs of bowel obstruction on imaging  - obstruction unlikely as patient is tolerating a diet   - patient makes urine; bumex 1 mg given in ED  - daily weights, strict I's & O's ordered   [ ] RUQ u/s to assess liver function pending  [ ] echo pending for evaluation of possible heart failure   [ ] will monitor urine output    #Nausea   - qtc 456 on EKG 2/23  - reglan 10mg BID     #DMII (A1c of 5.6 10/2023)  - home regimen of lantus 20 units nightly; endorses intermittent compliance  - endorses several hypoglycemic episodes  - lantus 10 units and sliding scale ordered     #HTN  - home felodipine not on formulary; amlodipine 10 mg ordered  - c/w home valsartan-hydrochlorothiazide 320-12.5 mg and  carvedilol 25 mg BID     #ESRD on HD (TTS)  - c/w calcium acetate 2,669 mg TID, lasix 20 mg daily   - renally dose meds   - avoid nephrotoxic agents (patient still produces urine)   - pt received extra HD session today 2/23; removed 1.4L   - resuming normal TTS HD     F as needed   E replete as needed   N NPO for RUQ US   GI not indicated   DVT ppx: jenny Espinal,   PGY-1 Family Medicine

## 2024-02-23 NOTE — NURSING NOTE
.Report from Sending RN:    Report From: MANI Mix  Recent Surgery of Procedure: No  Baseline Level of Consciousness (LOC): A&O X3  Oxygen Use: No  Type: Room air  Diabetic: Yes  Last BP Med Given Day of Dialysis: none  Last Pain Med Given: none  Lab Tests to be Obtained with Dialysis: Yes  Blood Transfusion to be Given During Dialysis: No  Available IV Access: Yes  Medications to be Administered During Dialysis: No  Continuous IV Infusion Running: No  Restraints on Currently or in the Last 24 Hours: No  Hand-Off Communication: Pt had no acute event overnight, vital signs stable. Not on precaution, no morning medication given.

## 2024-02-24 ENCOUNTER — APPOINTMENT (OUTPATIENT)
Dept: DIALYSIS | Facility: HOSPITAL | Age: 49
End: 2024-02-24
Payer: COMMERCIAL

## 2024-02-24 VITALS
RESPIRATION RATE: 16 BRPM | HEIGHT: 64 IN | DIASTOLIC BLOOD PRESSURE: 80 MMHG | TEMPERATURE: 97.5 F | SYSTOLIC BLOOD PRESSURE: 145 MMHG | BODY MASS INDEX: 34.86 KG/M2 | WEIGHT: 204.2 LBS | OXYGEN SATURATION: 92 % | HEART RATE: 62 BPM

## 2024-02-24 PROBLEM — K59.1 FUNCTIONAL DIARRHEA: Status: ACTIVE | Noted: 2024-02-24

## 2024-02-24 PROBLEM — K21.9 ACID REFLUX: Status: ACTIVE | Noted: 2024-02-24

## 2024-02-24 LAB
ALBUMIN SERPL BCP-MCNC: 3.1 G/DL (ref 3.4–5)
ALP SERPL-CCNC: 56 U/L (ref 33–110)
ALT SERPL W P-5'-P-CCNC: 4 U/L (ref 7–45)
ANION GAP SERPL CALC-SCNC: 15 MMOL/L (ref 10–20)
AST SERPL W P-5'-P-CCNC: 12 U/L (ref 9–39)
BILIRUB SERPL-MCNC: 1 MG/DL (ref 0–1.2)
BUN SERPL-MCNC: 28 MG/DL (ref 6–23)
CALCIUM SERPL-MCNC: 9.4 MG/DL (ref 8.6–10.6)
CHLORIDE SERPL-SCNC: 94 MMOL/L (ref 98–107)
CO2 SERPL-SCNC: 32 MMOL/L (ref 21–32)
CREAT SERPL-MCNC: 7.43 MG/DL (ref 0.5–1.05)
EGFRCR SERPLBLD CKD-EPI 2021: 6 ML/MIN/1.73M*2
ERYTHROCYTE [DISTWIDTH] IN BLOOD BY AUTOMATED COUNT: 17.5 % (ref 11.5–14.5)
ETHYL GLUCURONIDE UR QL SCN: NEGATIVE NG/ML
GLUCOSE BLD MANUAL STRIP-MCNC: 70 MG/DL (ref 74–99)
GLUCOSE SERPL-MCNC: 63 MG/DL (ref 74–99)
HCT VFR BLD AUTO: 36.8 % (ref 36–46)
HGB BLD-MCNC: 11.2 G/DL (ref 12–16)
MAGNESIUM SERPL-MCNC: 1.81 MG/DL (ref 1.6–2.4)
MCH RBC QN AUTO: 27.9 PG (ref 26–34)
MCHC RBC AUTO-ENTMCNC: 30.4 G/DL (ref 32–36)
MCV RBC AUTO: 92 FL (ref 80–100)
NRBC BLD-RTO: 0 /100 WBCS (ref 0–0)
PLATELET # BLD AUTO: 95 X10*3/UL (ref 150–450)
POTASSIUM SERPL-SCNC: 3.8 MMOL/L (ref 3.5–5.3)
PROT SERPL-MCNC: 7.9 G/DL (ref 6.4–8.2)
RBC # BLD AUTO: 4.01 X10*6/UL (ref 4–5.2)
SODIUM SERPL-SCNC: 137 MMOL/L (ref 136–145)
WBC # BLD AUTO: 3.3 X10*3/UL (ref 4.4–11.3)

## 2024-02-24 PROCEDURE — 90935 HEMODIALYSIS ONE EVALUATION: CPT | Performed by: INTERNAL MEDICINE

## 2024-02-24 PROCEDURE — 82947 ASSAY GLUCOSE BLOOD QUANT: CPT

## 2024-02-24 PROCEDURE — G0378 HOSPITAL OBSERVATION PER HR: HCPCS

## 2024-02-24 PROCEDURE — 2500000001 HC RX 250 WO HCPCS SELF ADMINISTERED DRUGS (ALT 637 FOR MEDICARE OP): Mod: SE | Performed by: STUDENT IN AN ORGANIZED HEALTH CARE EDUCATION/TRAINING PROGRAM

## 2024-02-24 PROCEDURE — 8010000001 HC DIALYSIS - HEMODIALYSIS PER DAY

## 2024-02-24 PROCEDURE — 2500000002 HC RX 250 W HCPCS SELF ADMINISTERED DRUGS (ALT 637 FOR MEDICARE OP, ALT 636 FOR OP/ED): Mod: SE | Performed by: STUDENT IN AN ORGANIZED HEALTH CARE EDUCATION/TRAINING PROGRAM

## 2024-02-24 PROCEDURE — 99239 HOSP IP/OBS DSCHRG MGMT >30: CPT

## 2024-02-24 PROCEDURE — 85027 COMPLETE CBC AUTOMATED: CPT

## 2024-02-24 PROCEDURE — 83735 ASSAY OF MAGNESIUM: CPT

## 2024-02-24 PROCEDURE — 84075 ASSAY ALKALINE PHOSPHATASE: CPT

## 2024-02-24 PROCEDURE — 36415 COLL VENOUS BLD VENIPUNCTURE: CPT

## 2024-02-24 RX ORDER — PANTOPRAZOLE SODIUM 40 MG/1
40 TABLET, DELAYED RELEASE ORAL DAILY
Qty: 90 TABLET | Refills: 3 | Status: SHIPPED | OUTPATIENT
Start: 2024-02-25 | End: 2025-02-24

## 2024-02-24 RX ORDER — FAMOTIDINE 20 MG/1
20 TABLET, FILM COATED ORAL DAILY
Status: DISCONTINUED | OUTPATIENT
Start: 2024-02-24 | End: 2024-02-24

## 2024-02-24 RX ORDER — CALCIUM CARBONATE 200(500)MG
1000 TABLET,CHEWABLE ORAL DAILY
Status: DISCONTINUED | OUTPATIENT
Start: 2024-02-24 | End: 2024-02-24 | Stop reason: HOSPADM

## 2024-02-24 RX ORDER — PANTOPRAZOLE SODIUM 40 MG/1
40 TABLET, DELAYED RELEASE ORAL DAILY
Status: DISCONTINUED | OUTPATIENT
Start: 2024-02-24 | End: 2024-02-24 | Stop reason: HOSPADM

## 2024-02-24 RX ORDER — LOPERAMIDE HYDROCHLORIDE 2 MG/1
2 CAPSULE ORAL 4 TIMES DAILY PRN
Qty: 30 CAPSULE | Refills: 0 | Status: SHIPPED | OUTPATIENT
Start: 2024-02-24

## 2024-02-24 RX ADMIN — METOCLOPRAMIDE 10 MG: 10 TABLET ORAL at 10:30

## 2024-02-24 RX ADMIN — ASPIRIN 81 MG 81 MG: 81 TABLET ORAL at 10:30

## 2024-02-24 RX ADMIN — AMLODIPINE BESYLATE 10 MG: 10 TABLET ORAL at 10:31

## 2024-02-24 RX ADMIN — CALCIUM CARBONATE (ANTACID) CHEW TAB 500 MG 1000 MG: 500 CHEW TAB at 12:04

## 2024-02-24 RX ADMIN — HYDROCHLOROTHIAZIDE 12.5 MG: 25 TABLET ORAL at 10:30

## 2024-02-24 RX ADMIN — FERROUS SULFATE TAB 325 MG (65 MG ELEMENTAL FE) 1 TABLET: 325 (65 FE) TAB at 10:31

## 2024-02-24 RX ADMIN — PANTOPRAZOLE SODIUM 40 MG: 40 TABLET, DELAYED RELEASE ORAL at 12:04

## 2024-02-24 RX ADMIN — PHENOL 1 SPRAY: 1.4 SPRAY ORAL at 05:19

## 2024-02-24 RX ADMIN — FUROSEMIDE 20 MG: 20 TABLET ORAL at 10:31

## 2024-02-24 RX ADMIN — CALCIUM ACETATE 2668 MG: 667 CAPSULE ORAL at 12:05

## 2024-02-24 RX ADMIN — CARVEDILOL 25 MG: 25 TABLET, FILM COATED ORAL at 10:31

## 2024-02-24 RX ADMIN — VALSARTAN 320 MG: 320 TABLET ORAL at 10:31

## 2024-02-24 ASSESSMENT — COGNITIVE AND FUNCTIONAL STATUS - GENERAL
MOBILITY SCORE: 24
DAILY ACTIVITIY SCORE: 24

## 2024-02-24 ASSESSMENT — PAIN SCALES - GENERAL
PAINLEVEL_OUTOF10: 0 - NO PAIN
PAINLEVEL_OUTOF10: 0 - NO PAIN
PAINLEVEL_OUTOF10: 7
PAINLEVEL_OUTOF10: 0 - NO PAIN

## 2024-02-24 ASSESSMENT — PAIN - FUNCTIONAL ASSESSMENT
PAIN_FUNCTIONAL_ASSESSMENT: NO/DENIES PAIN
PAIN_FUNCTIONAL_ASSESSMENT: 0-10
PAIN_FUNCTIONAL_ASSESSMENT: NO/DENIES PAIN

## 2024-02-24 NOTE — PROGRESS NOTES
Renal Staff HD Note--Late note    Patient seen, examined on dialysis   Tolerating treatment without event  Asked to come off early because she felt uneasy  136/64  91 kg pre (DW 95 kg) AVF L UE 3K    Continue treatment per submitted orders  Goal 3 L -- 2.8 removed  AMA form signed

## 2024-02-24 NOTE — CARE PLAN
The patient's goals for the shift include      The clinical goals for the shift include Rissa will remain neurologically intact throughout the shift.      Problem: Discharge Barriers  Goal: My discharge needs are met  Outcome: Progressing     Problem: Diabetes  Goal: I will have no complications due to diabetes  Outcome: Progressing     Problem: Pain  Goal: Takes deep breaths with improved pain control throughout the shift  Outcome: Progressing  Goal: Turns in bed with improved pain control throughout the shift  Outcome: Progressing  Goal: Walks with improved pain control throughout the shift  Outcome: Progressing  Goal: Performs ADL's with improved pain control throughout shift  Outcome: Progressing  Goal: Participates in PT with improved pain control throughout the shift  Outcome: Progressing  Goal: Free from opioid side effects throughout the shift  Outcome: Progressing  Goal: Free from acute confusion related to pain meds throughout the shift  Outcome: Progressing

## 2024-02-24 NOTE — HOSPITAL COURSE
Rissa Bhakta is a 48 y.o. female presenting with ESRD on HD (TTS), DMII (A1c of 5.6 10/2023), HTN, restless leg syndrome, anemia, HLD, and POTS who presents with 3 days of abdominal distension, orthopnea, worsening SOB. Pt explains she usually skips/misses one HD sessions each week but never misses two in a week. Found to have elevated BNP w/ normal TTE. AST and ALT, ALKP normal. Hepatomegaly w/ steatosis, thickened gall bladder wall, cholelithiasis, significant ascites found both on CT abd pelvis and RUQ US. Pt treated primarily with HD resulting in moderate improvement of abdominal and leg swelling. Presentation most likely 2/2 frequently missed HD sessions on top of mild fatty liver disease. Pt education on importance of HD adherence provided, pt in understanding. Pt in stable condition and ready for discharge home.

## 2024-02-24 NOTE — CARE PLAN
Problem: Discharge Barriers  Goal: My discharge needs are met  2/24/2024 1515 by Constance Silva RN  Outcome: Met  2/24/2024 1039 by Constance Silva RN  Outcome: Progressing     Problem: Diabetes  Goal: I will have no complications due to diabetes  2/24/2024 1515 by Constance Silva RN  Outcome: Met  2/24/2024 1039 by Constance Silva RN  Outcome: Progressing     Problem: Pain  Goal: Takes deep breaths with improved pain control throughout the shift  2/24/2024 1515 by Constance Silva RN  Outcome: Met  2/24/2024 1039 by Constance Silva RN  Outcome: Progressing  Goal: Turns in bed with improved pain control throughout the shift  2/24/2024 1515 by Constance Silva RN  Outcome: Met  2/24/2024 1039 by Constance Silva RN  Outcome: Progressing  Goal: Walks with improved pain control throughout the shift  2/24/2024 1515 by Constance Silva RN  Outcome: Met  2/24/2024 1039 by Constance Silva RN  Outcome: Progressing  Goal: Performs ADL's with improved pain control throughout shift  2/24/2024 1515 by Constance Silva RN  Outcome: Met  2/24/2024 1039 by Constance Silva RN  Outcome: Progressing  Goal: Participates in PT with improved pain control throughout the shift  2/24/2024 1515 by Constance Silva RN  Outcome: Met  2/24/2024 1039 by Constance Silva RN  Outcome: Progressing  Goal: Free from opioid side effects throughout the shift  2/24/2024 1515 by Constance Silva RN  Outcome: Met  2/24/2024 1039 by Constance Silva RN  Outcome: Progressing  Goal: Free from acute confusion related to pain meds throughout the shift  2/24/2024 1515 by Constance Silva RN  Outcome: Met  2/24/2024 1039 by Constance Silva RN  Outcome: Progressing   The patient's goals for the shift include      The clinical goals for the shift include patient will be safe and free from falls throughout shift

## 2024-02-24 NOTE — NURSING NOTE
Report to Receiving RN:    Report To: renata  Time Report Called: 1004  Hand-Off Communication: removed 2L of fluid, post tx /69/64, pt stable and alert, bleeding stopped at MO fistula site   Complications During Treatment: No  Ultrafiltration Treatment: No  Medications Administered During Dialysis: No  Blood Products Administered During Dialysis: No  Labs Sent During Dialysis: No  Heparin Drip Rate Changes: No    Electronic Signatures:    (Signed  )   Authored:      (Signed  )   Authored:      Last Updated: 10:04 AM by DAIANA AYALA

## 2024-02-24 NOTE — CARE PLAN
Problem: Discharge Barriers  Goal: My discharge needs are met  Outcome: Progressing     Problem: Diabetes  Goal: I will have no complications due to diabetes  Outcome: Progressing     Problem: Pain  Goal: Takes deep breaths with improved pain control throughout the shift  Outcome: Progressing  Goal: Turns in bed with improved pain control throughout the shift  Outcome: Progressing  Goal: Walks with improved pain control throughout the shift  Outcome: Progressing  Goal: Performs ADL's with improved pain control throughout shift  Outcome: Progressing  Goal: Participates in PT with improved pain control throughout the shift  Outcome: Progressing  Goal: Free from opioid side effects throughout the shift  Outcome: Progressing  Goal: Free from acute confusion related to pain meds throughout the shift  Outcome: Progressing   The patient's goals for the shift include      The clinical goals for the shift include keep safe

## 2024-02-24 NOTE — NURSING NOTE
.Report from Sending RN:    Report From: MANI Ozuna  Recent Surgery of Procedure: No  Baseline Level of Consciousness (LOC): A&O X4  Oxygen Use: No  Type: Room air  Diabetic: Yes  Last BP Med Given Day of Dialysis: none  Last Pain Med Given: none  Lab Tests to be Obtained with Dialysis: no  Blood Transfusion to be Given During Dialysis: No  Available IV Access: Yes  Medications to be Administered During Dialysis: No  Continuous IV Infusion Running: No  Restraints on Currently or in the Last 24 Hours: No  Hand-Off Communication: Pt had acute event overnight, vital sign stable. Not on precaution and no morning medication given.

## 2024-02-24 NOTE — DISCHARGE SUMMARY
Discharge Diagnosis  Fluid overload    Issues Requiring Follow-Up  - adherence to HD sessions     Hospital Course  Rissa Bhakta is a 48 y.o. female presenting with ESRD on HD (TTS), DMII (A1c of 5.6 10/2023), HTN, restless leg syndrome, anemia, HLD, and POTS who presents with 3 days of abdominal distension, orthopnea, worsening SOB. Pt explains she usually skips/misses one HD sessions each week but never misses two in a week. Found to have elevated BNP w/ normal TTE. AST and ALT, ALKP normal. Hepatomegaly w/ steatosis, thickened gall bladder wall, cholelithiasis, significant ascites found both on CT abd pelvis and RUQ US. Pt treated primarily with HD resulting in moderate improvement of abdominal and leg swelling. Presentation most likely 2/2 frequently missed HD sessions on top of mild fatty liver disease. Pt education on importance of HD adherence provided, pt in understanding. Pt in stable condition and ready for discharge home.     Pertinent Physical Exam At Time of Discharge    Cardiovascular:      Rate and Rhythm: Normal rate and regular rhythm.   Pulmonary:      Effort: Pulmonary effort is normal.      Breath sounds: Bibasilar Rales present (significantly improved)  Abdominal:      General: Abdomen is protuberant. There is distension.      Tenderness: There is no abdominal tenderness. There is no guarding.   Musculoskeletal:         General: No tenderness. Normal range of motion.      Right lower leg: +1 Edema present.      Left lower leg: +1 Edema present.   Skin:     General: Skin is warm and dry.   Neurological:      General: No focal deficit present.      Mental Status: She is alert and oriented to person, place, and time.   Psychiatric:         Mood and Affect: Mood normal.         Behavior: Behavior normal.     Home Medications     Medication List      START taking these medications     loperamide 2 mg capsule; Commonly known as: Imodium; Take 1 capsule (2   mg) by mouth 4 times a day as needed for  diarrhea.   pantoprazole 40 mg EC tablet; Commonly known as: ProtoNix; Take 1 tablet   (40 mg) by mouth once daily. Do not crush, chew, or split. Do not start   before February 25, 2024.; Start taking on: February 25, 2024     CONTINUE taking these medications     aspirin 81 mg chewable tablet   aspirin-acetaminophen-caffeine 250-250-65 mg tablet; Commonly known as:   Excedrin Migraine   Calcium 500 500 mg calcium (1,250 mg) chewable tablet; Generic drug:   calcium carbonate   calcium acetate 667 mg tablet tablet; Commonly known as: Calphron   carvedilol 25 mg tablet; Commonly known as: Coreg   felodipine ER 10 mg 24 hr tablet; Commonly known as: Plendil   ferrous sulfate (325 mg ferrous sulfate) tablet   furosemide 20 mg tablet; Commonly known as: Lasix   gabapentin 300 mg capsule; Commonly known as: Neurontin   hydrOXYzine HCL 10 mg tablet; Commonly known as: Atarax   insulin glargine 100 unit/mL injection; Commonly known as: Lantus;   Inject 10 Units under the skin once daily at bedtime. Take as directed per   insulin instructions.   metoclopramide 10 mg tablet; Commonly known as: Reglan   NovoLOG U-100 Insulin aspart 100 unit/mL injection; Generic drug:   insulin aspart   pregabalin 75 mg capsule; Commonly known as: Lyrica   rOPINIRole 1 mg tablet; Commonly known as: Requip   simvastatin 20 mg tablet; Commonly known as: Zocor   valsartan-hydrochlorothiazide 320-12.5 mg tablet; Commonly known as:   Diovan-HCT   Voltaren 1 % gel; Generic drug: diclofenac sodium       Outpatient Follow-Up  Future Appointments   Date Time Provider Department Center   3/4/2024  3:00 PM Dave Medina MD [id number]   3/11/2024 10:10 AM Dave Medina MD [id number]       Bucky Espinal DO  PGY-1 Family Medicine

## 2024-02-24 NOTE — DISCHARGE INSTRUCTIONS
Ms. Bhakta,    You came to the hospital due to worsening abdominal distension, leg swelling, nausea and vomiting. Your body was retaining a lot of extra fluid which is commonly seen in heart, liver and kidney diseases. Your heart was found to have no issues. Your liver however was found to be slightly enlarged caused by something called 'fatty liver disease'. This certainly contributed to condition you were in, however the liver disease is not severe enough to cause that much fluid build up. The condition you were in when you came to the hospital is consistent with frequently missing dialysis sessions on top of preexisting liver disease, which is why treating you with extra dialysis began to improve your swelling. The best way to treat this fluid build up and to prevent it from happening again is to attend all three of your weekly dialysis sessions on a consistent basis. We are also sending you home with anti-nausea medication to use as needed in addition to a daily medication called Pantoprazole which will help with 'heart burn' or Acid Reflux.     Please make an appointment to follow up with your primary care provider for a hospital follow up visit as early as possible.     It was a pleasure taking care of you! Stay well.    Dr. Espinal

## 2024-02-25 LAB
AMPHETAMINES SERPL QL SCN: NEGATIVE NG/ML
ANNOTATION COMMENT IMP: NORMAL
BARBITURATES SERPL QL SCN: NEGATIVE NG/ML
BENZODIAZ SERPL QL SCN: NEGATIVE NG/ML
BUPRENORPHINE SERPL-MCNC: NEGATIVE NG/ML
CANNABINOIDS SERPL QL SCN: NEGATIVE NG/ML
COCAINE SERPL QL SCN: NEGATIVE NG/ML
METHADONE SERPL QL SCN: NEGATIVE NG/ML
METHAMPHET SERPL QL: NEGATIVE NG/ML
OPIATES SERPL QL SCN: NEGATIVE NG/ML
OXYCODONE SERPL QL: NEGATIVE NG/ML
PCP SERPL QL SCN: NEGATIVE NG/ML

## 2024-02-26 ENCOUNTER — PATIENT OUTREACH (OUTPATIENT)
Dept: CARE COORDINATION | Facility: CLINIC | Age: 49
End: 2024-02-26
Payer: COMMERCIAL

## 2024-02-26 LAB
ATRIAL RATE: 66 BPM
P AXIS: 49 DEGREES
P OFFSET: 190 MS
P ONSET: 120 MS
PR INTERVAL: 186 MS
Q ONSET: 213 MS
QRS COUNT: 11 BEATS
QRS DURATION: 94 MS
QT INTERVAL: 464 MS
QTC CALCULATION(BAZETT): 486 MS
QTC FREDERICIA: 479 MS
R AXIS: 48 DEGREES
T AXIS: -4 DEGREES
T OFFSET: 445 MS
VENTRICULAR RATE: 66 BPM

## 2024-02-26 NOTE — PROGRESS NOTES
Outreach call to patient to support a smooth transition of care from recent admission. Pt can not receive calls per automatic message.

## 2024-03-04 ENCOUNTER — APPOINTMENT (OUTPATIENT)
Dept: GASTROENTEROLOGY | Facility: HOSPITAL | Age: 49
End: 2024-03-04
Payer: COMMERCIAL

## 2024-03-11 ENCOUNTER — HOSPITAL ENCOUNTER (OUTPATIENT)
Dept: GASTROENTEROLOGY | Facility: HOSPITAL | Age: 49
Setting detail: OUTPATIENT SURGERY
Discharge: HOME | End: 2024-03-11
Payer: COMMERCIAL

## 2024-03-11 DIAGNOSIS — Z12.11 ENCOUNTER FOR SCREENING FOR MALIGNANT NEOPLASM OF COLON: Primary | ICD-10-CM

## 2024-03-11 RX ORDER — POLYETHYLENE GLYCOL 3350, SODIUM CHLORIDE, SODIUM BICARBONATE AND POTASSIUM CHLORIDE 420; 5.72; 11.2; 1.48 G/4L; G/4L; G/4L; G/4L
4000 POWDER, FOR SOLUTION ORAL ONCE
Qty: 4000 ML | Refills: 0 | Status: SHIPPED | OUTPATIENT
Start: 2024-03-11 | End: 2024-03-11

## 2024-03-11 NOTE — H&P
History Of Present Illness  Rissa Bhakta is a 48 y.o. female presenting with average risk screening for colorectal cancer for open-access colonoscopy.     Past Medical History  Past Medical History:   Diagnosis Date    Anemia associated with chronic renal failure     Diabetes mellitus (CMS/HCC)     ESRD (end stage renal disease) on dialysis (CMS/HCC)     Hyperlipidemia     Hypertension     Insomnia     POTS (postural orthostatic tachycardia syndrome)     Restless leg syndrome      Surgical History  Past Surgical History:   Procedure Laterality Date    AV FISTULA PLACEMENT Left      Social History  She reports that she has never smoked. She has never used smokeless tobacco. She reports that she does not currently use alcohol after a past usage of about 1.0 standard drink of alcohol per week. She reports that she does not use drugs.    Family History  Family History   Problem Relation Name Age of Onset    No Known Problems Mother      No Known Problems Father      No Known Problems Sister      No Known Problems Brother      No Known Problems Daughter      No Known Problems Son          Allergies  Allergies   Allergen Reactions    Cherry Hives and Itching    Coconut Flavor Itching and Swelling    Morphine Hcl Itching    Mushroom Itching and Swelling     Mouth and tongue swelling    Strawberry Itching and Swelling       Pre-sedation Evaluation:  ASA Classification - ASA 3 - Patient with moderate systemic disease with functional limitations  Mallampati Score - III (soft and hard palate and base of uvula visible)    Physical Exam  Constitutional:       Appearance: Normal appearance.   HENT:      Mouth/Throat:      Mouth: Mucous membranes are moist.   Eyes:      Conjunctiva/sclera: Conjunctivae normal.   Cardiovascular:      Rate and Rhythm: Normal rate.   Pulmonary:      Effort: Pulmonary effort is normal.   Abdominal:      Palpations: Abdomen is soft.   Skin:     General: Skin is warm.   Neurological:      Mental  Status: She is oriented to person, place, and time.   Psychiatric:         Mood and Affect: Mood normal.          Last Recorded Vitals  There were no vitals taken for this visit.     Assessment/Plan   average risk screening for colorectal cancer for open-access colonoscopy     PTA/Current Medications:  (Not in a hospital admission)    Current Outpatient Medications   Medication Sig Dispense Refill    aspirin 81 mg chewable tablet Chew 1 tablet (81 mg) once daily.      aspirin-acetaminophen-caffeine (Excedrin Migraine) 250-250-65 mg tablet Take 2 tablets by mouth every 6 hours if needed for headaches or moderate pain (4 - 6).      calcium acetate (Calphron) 667 mg tablet tablet Take 4 tablets (2,668 mg) by mouth 3 times a day with meals.      calcium carbonate (Calcium 500) 500 mg calcium (1,250 mg) chewable tablet Chew 1 tablet (1,250 mg) once daily.      carvedilol (Coreg) 25 mg tablet Take 1 tablet (25 mg) by mouth 2 times a day.      diclofenac sodium (Voltaren) 1 % gel gel Apply 4.5 inches (4 g) topically 2 times a day as needed (pain).      felodipine ER (Plendil) 10 mg 24 hr tablet Take 1 tablet (10 mg) by mouth once daily.      ferrous sulfate 325 (65 Fe) MG tablet Take 1 tablet by mouth 2 times a day.      furosemide (Lasix) 20 mg tablet Take 1 tablet (20 mg) by mouth once daily.      gabapentin (Neurontin) 300 mg capsule Take 1 capsule (300 mg) by mouth once daily.      hydrOXYzine HCL (Atarax) 10 mg tablet Take 1 tablet (10 mg) by mouth every 8 hours if needed for itching.      insulin aspart (NovoLOG U-100 Insulin aspart) 100 unit/mL injection Inject 0-10 Units under the skin 3 times a day with meals. Take as directed per insulin instructions.      insulin glargine (Lantus) 100 unit/mL injection Inject 10 Units under the skin once daily at bedtime. Take as directed per insulin instructions. 10 mL 0    loperamide (Imodium) 2 mg capsule Take 1 capsule (2 mg) by mouth 4 times a day as needed for diarrhea. 30  capsule 0    metoclopramide (Reglan) 10 mg tablet Take 1 tablet (10 mg) by mouth 2 times a day.      pantoprazole (ProtoNix) 40 mg EC tablet Take 1 tablet (40 mg) by mouth once daily. Do not crush, chew, or split. Do not start before February 25, 2024. 90 tablet 3    pregabalin (Lyrica) 75 mg capsule Take 1 capsule (75 mg) by mouth 2 times a day.      rOPINIRole (Requip) 1 mg tablet Take 1 tablet (1 mg) by mouth once daily at bedtime.      simvastatin (Zocor) 20 mg tablet Take 1 tablet (20 mg) by mouth.      valsartan-hydrochlorothiazide (Diovan-HCT) 320-12.5 mg tablet Take 1 tablet by mouth once daily.       No current facility-administered medications for this encounter.     Dave Medina MD

## 2024-04-25 ENCOUNTER — HOSPITAL ENCOUNTER (OUTPATIENT)
Dept: GASTROENTEROLOGY | Facility: HOSPITAL | Age: 49
Setting detail: OUTPATIENT SURGERY
Discharge: HOME | End: 2024-04-25
Payer: COMMERCIAL

## 2024-04-25 VITALS
RESPIRATION RATE: 18 BRPM | WEIGHT: 210 LBS | SYSTOLIC BLOOD PRESSURE: 152 MMHG | TEMPERATURE: 97.3 F | BODY MASS INDEX: 35.85 KG/M2 | HEIGHT: 64 IN | OXYGEN SATURATION: 96 % | DIASTOLIC BLOOD PRESSURE: 80 MMHG | HEART RATE: 74 BPM

## 2024-04-25 DIAGNOSIS — Z12.11 ENCOUNTER FOR SCREENING FOR MALIGNANT NEOPLASM OF COLON: Primary | ICD-10-CM

## 2024-04-25 LAB
GLUCOSE BLD MANUAL STRIP-MCNC: 63 MG/DL (ref 74–99)
GLUCOSE BLD MANUAL STRIP-MCNC: 73 MG/DL (ref 74–99)
GLUCOSE BLD MANUAL STRIP-MCNC: 87 MG/DL (ref 74–99)

## 2024-04-25 PROCEDURE — 7100000009 HC PHASE TWO TIME - INITIAL BASE CHARGE

## 2024-04-25 PROCEDURE — 7100000010 HC PHASE TWO TIME - EACH INCREMENTAL 1 MINUTE

## 2024-04-25 PROCEDURE — 3700000013 HC SEDATION LEVEL 5+ TIME - EACH ADDITIONAL 15 MINUTES

## 2024-04-25 PROCEDURE — 3700000012 HC SEDATION LEVEL 5+ TIME - INITIAL 15 MINUTES 5/> YEARS

## 2024-04-25 PROCEDURE — 88305 TISSUE EXAM BY PATHOLOGIST: CPT | Mod: TC,59,91,SUR | Performed by: INTERNAL MEDICINE

## 2024-04-25 PROCEDURE — 2500000004 HC RX 250 GENERAL PHARMACY W/ HCPCS (ALT 636 FOR OP/ED): Mod: SE | Performed by: INTERNAL MEDICINE

## 2024-04-25 PROCEDURE — 82947 ASSAY GLUCOSE BLOOD QUANT: CPT

## 2024-04-25 PROCEDURE — 88305 TISSUE EXAM BY PATHOLOGIST: CPT | Performed by: STUDENT IN AN ORGANIZED HEALTH CARE EDUCATION/TRAINING PROGRAM

## 2024-04-25 PROCEDURE — 45385 COLONOSCOPY W/LESION REMOVAL: CPT | Performed by: INTERNAL MEDICINE

## 2024-04-25 PROCEDURE — 45380 COLONOSCOPY AND BIOPSY: CPT | Performed by: INTERNAL MEDICINE

## 2024-04-25 PROCEDURE — 99152 MOD SED SAME PHYS/QHP 5/>YRS: CPT | Performed by: INTERNAL MEDICINE

## 2024-04-25 PROCEDURE — 99153 MOD SED SAME PHYS/QHP EA: CPT | Performed by: INTERNAL MEDICINE

## 2024-04-25 RX ORDER — MIDAZOLAM HYDROCHLORIDE 1 MG/ML
INJECTION, SOLUTION INTRAMUSCULAR; INTRAVENOUS AS NEEDED
Status: COMPLETED | OUTPATIENT
Start: 2024-04-25 | End: 2024-04-25

## 2024-04-25 RX ORDER — FENTANYL CITRATE 50 UG/ML
INJECTION, SOLUTION INTRAMUSCULAR; INTRAVENOUS AS NEEDED
Status: COMPLETED | OUTPATIENT
Start: 2024-04-25 | End: 2024-04-25

## 2024-04-25 RX ADMIN — MIDAZOLAM 2 MG: 1 INJECTION INTRAMUSCULAR; INTRAVENOUS at 10:25

## 2024-04-25 RX ADMIN — FENTANYL CITRATE 25 MCG: 50 INJECTION, SOLUTION INTRAMUSCULAR; INTRAVENOUS at 10:28

## 2024-04-25 RX ADMIN — FENTANYL CITRATE 25 MCG: 50 INJECTION, SOLUTION INTRAMUSCULAR; INTRAVENOUS at 10:33

## 2024-04-25 RX ADMIN — MIDAZOLAM 1 MG: 1 INJECTION INTRAMUSCULAR; INTRAVENOUS at 10:28

## 2024-04-25 RX ADMIN — FENTANYL CITRATE 50 MCG: 50 INJECTION, SOLUTION INTRAMUSCULAR; INTRAVENOUS at 10:25

## 2024-04-25 RX ADMIN — MIDAZOLAM 1 MG: 1 INJECTION INTRAMUSCULAR; INTRAVENOUS at 10:33

## 2024-04-25 ASSESSMENT — PAIN SCALES - GENERAL
PAINLEVEL_OUTOF10: 0 - NO PAIN
PAINLEVEL_OUTOF10: 2

## 2024-04-25 ASSESSMENT — COLUMBIA-SUICIDE SEVERITY RATING SCALE - C-SSRS
6. HAVE YOU EVER DONE ANYTHING, STARTED TO DO ANYTHING, OR PREPARED TO DO ANYTHING TO END YOUR LIFE?: NO
2. HAVE YOU ACTUALLY HAD ANY THOUGHTS OF KILLING YOURSELF?: NO
1. IN THE PAST MONTH, HAVE YOU WISHED YOU WERE DEAD OR WISHED YOU COULD GO TO SLEEP AND NOT WAKE UP?: NO

## 2024-04-25 ASSESSMENT — PAIN - FUNCTIONAL ASSESSMENT
PAIN_FUNCTIONAL_ASSESSMENT: 0-10

## 2024-04-25 NOTE — SIGNIFICANT EVENT
Patient wasn't feeling the best, blood sugar was taken it was 63 patient was given cookies and apple juice. Will reassess soon.

## 2024-04-25 NOTE — DISCHARGE INSTRUCTIONS
Patient Instructions after a Colonoscopy      The anesthetics, sedatives or narcotics which were given to you today will be acting in your body for the next 24 hours, so you might feel a little sleepy or groggy.  This feeling should slowly wear off. Carefully read and follow the instructions.     You received sedation today:  - Do not drive or operate any machinery or power tools of any kind.   - No alcoholic beverages today, not even beer or wine.  - Do not make any important decisions or sign any legal documents.  - No over the counter medications that contain alcohol or that may cause drowsiness.  - Do not make any important decisions or sign any legal documents.    While it is common to experience mild to moderate abdominal distention, gas, or belching after your procedure, if any of these symptoms occur following discharge from the GI Lab or within one week of having your procedure, call the [facility] to be advised whether a visit to your nearest Urgent Care or Emergency Department is indicated.  Take this paper with you if you go.     - If you develop an allergic reaction to the medications that were given during your procedure such as difficulty breathing, rash, hives, severe nausea, vomiting or lightheadedness.  - If you experience chest pain, shortness of breath, severe abdominal pain, fevers and chills.  -If you develop signs and symptoms of bleeding such as blood in your spit, if your stools turn black, tarry, or bloody  - If you have not urinated within 8 hours following your procedure.  - If your IV site becomes painful, red, inflamed, or looks infected.    If you received a biopsy/polypectomy/sphincterotomy the following instructions apply below:    __ Do not use Aspirin containing products, non-steroidal medications or anti-coagulants for one week following your procedure. (Examples of these types of medications are: Advil, Arthrotec, Aleve, Coumadin, Ecotrin, Heparin, Ibuprofen,  Indocin, Motrin, Naprosyn, Nuprin, Plavix, Vioxx, and Voltarin, or their generic forms.  This list is not all-inclusive.  Check with your physician or pharmacist before resuming medications.)   __ Eat a soft diet today.  Avoid foods that are poorly digested for the next 24 hours.  These foods would include: nuts, beans, lettuce, red meats, and fried foods. Start with liquids and advance your diet as tolerated, gradually work up to eating solids.   __ Do not have a Barium Study or Enema for one week.    Your physician recommends the additional following instructions:    -You have a contact number available for emergencies. The signs and symptoms of potential delayed complications were discussed with you. You may return to normal activities tomorrow.  -Resume your previous diet.  -Continue your present medications.   -We are waiting for your pathology results.  -Your physician has recommended a repeat colonoscopy (date to be determined after pending pathology results are reviewed) for surveillance based on pathology results.  -The findings and recommendations have been discussed with you.  -The findings and recommendations were discussed with your family.  - Please see Medication Reconciliation Form for new medication/medications prescribed.       If you experience any problems or have any questions following discharge from the GI Lab, please call:        Nurse Signature                                                                        Date___________________                                                                            Patient/Responsible Party Signature                                        Date___________________

## 2024-04-25 NOTE — H&P
History Of Present Illness  Rissa Bhakta is a 48 y.o. female presenting with average risk screening for colorectal cancer here for repeat open-access colonoscopy (prior procedure poor prep).     Past Medical History  Past Medical History:   Diagnosis Date    Anemia associated with chronic renal failure     Diabetes mellitus (Multi)     ESRD (end stage renal disease) on dialysis (Multi)     Hyperlipidemia     Hypertension     Insomnia     POTS (postural orthostatic tachycardia syndrome)     Restless leg syndrome      Surgical History  Past Surgical History:   Procedure Laterality Date    AV FISTULA PLACEMENT Left      Social History  She reports that she has never smoked. She has never used smokeless tobacco. She reports that she does not currently use alcohol after a past usage of about 1.0 standard drink of alcohol per week. She reports that she does not use drugs.    Family History  Family History   Problem Relation Name Age of Onset    No Known Problems Mother      No Known Problems Father      No Known Problems Sister      No Known Problems Brother      No Known Problems Daughter      No Known Problems Son          Allergies  Allergies   Allergen Reactions    Cherry Hives and Itching    Coconut Flavor Itching and Swelling    Lyrica [Pregabalin] Hallucinations     Pt stated that she had thoughts of harming her children while on medication and fell asleep and woke up in her vehicle.     Morphine Hcl Itching    Mushroom Itching and Swelling     Mouth and tongue swelling    Strawberry Itching and Swelling       Pre-sedation Evaluation:  ASA Classification - ASA 3 - Patient with moderate systemic disease with functional limitations  Mallampati Score - III (soft and hard palate and base of uvula visible)    Physical Exam  Constitutional:       Appearance: She is obese.   HENT:      Mouth/Throat:      Mouth: Mucous membranes are moist.   Eyes:      Conjunctiva/sclera: Conjunctivae normal.   Cardiovascular:       Rate and Rhythm: Normal rate.   Pulmonary:      Effort: Pulmonary effort is normal.   Abdominal:      Palpations: Abdomen is soft.   Skin:     General: Skin is warm.   Neurological:      Mental Status: She is oriented to person, place, and time.   Psychiatric:         Mood and Affect: Mood normal.          Last Recorded Vitals  There were no vitals taken for this visit.     Assessment/Plan   average risk screening for colorectal cancer here for repeat open-access colonoscopy (prior procedure poor prep).     PTA/Current Medications:  (Not in a hospital admission)    Current Outpatient Medications   Medication Sig Dispense Refill    aspirin 81 mg chewable tablet Chew 1 tablet (81 mg) once daily.      calcium acetate (Calphron) 667 mg tablet tablet Take 3 tablets (2,001 mg) by mouth 3 times a day with meals.      carvedilol (Coreg) 25 mg tablet Take 1 tablet (25 mg) by mouth 2 times a day.      ferrous sulfate 325 (65 Fe) MG tablet Take 1 tablet by mouth 2 times a day.      furosemide (Lasix) 20 mg tablet Take 1 tablet (20 mg) by mouth once daily.      hydrOXYzine HCL (Atarax) 10 mg tablet Take 1 tablet (10 mg) by mouth every 8 hours if needed for itching.      insulin glargine (Lantus) 100 unit/mL injection Inject 10 Units under the skin once daily at bedtime. Take as directed per insulin instructions. 10 mL 0    loperamide (Imodium) 2 mg capsule Take 1 capsule (2 mg) by mouth 4 times a day as needed for diarrhea. 30 capsule 0    simvastatin (Zocor) 20 mg tablet Take 1 tablet (20 mg) by mouth.      valsartan-hydrochlorothiazide (Diovan-HCT) 320-12.5 mg tablet Take 1 tablet by mouth once daily.      aspirin-acetaminophen-caffeine (Excedrin Migraine) 250-250-65 mg tablet Take 2 tablets by mouth every 6 hours if needed for headaches or moderate pain (4 - 6).      calcium carbonate (Calcium 500) 500 mg calcium (1,250 mg) chewable tablet Chew 1 tablet (1,250 mg) once daily.      diclofenac sodium (Voltaren) 1 % gel gel  Apply 4.5 inches (4 g) topically 2 times a day as needed (pain).      felodipine ER (Plendil) 10 mg 24 hr tablet Take 1 tablet (10 mg) by mouth once daily.      insulin aspart (NovoLOG U-100 Insulin aspart) 100 unit/mL injection Inject 0-10 Units under the skin 3 times a day with meals. Take as directed per insulin instructions.      metoclopramide (Reglan) 10 mg tablet Take 1 tablet (10 mg) by mouth 2 times a day.      pantoprazole (ProtoNix) 40 mg EC tablet Take 1 tablet (40 mg) by mouth once daily. Do not crush, chew, or split. Do not start before February 25, 2024. (Patient not taking: Reported on 4/25/2024) 90 tablet 3    rOPINIRole (Requip) 1 mg tablet Take 1 tablet (1 mg) by mouth once daily at bedtime.       No current facility-administered medications for this encounter.     Dave Medina MD

## 2024-04-26 ASSESSMENT — PAIN SCALES - GENERAL: PAINLEVEL_OUTOF10: 0 - NO PAIN

## 2024-05-01 LAB
LABORATORY COMMENT REPORT: NORMAL
PATH REPORT.FINAL DX SPEC: NORMAL
PATH REPORT.GROSS SPEC: NORMAL
PATH REPORT.TOTAL CANCER: NORMAL

## 2024-05-01 NOTE — RESULT ENCOUNTER NOTE
A  [employer] message was sent to patient regarding the results and recommendations as follows:    Dear Ms. Bhakta,    I am writing you to inform you that several of the polyps that we removed from your colon revealed benign pre-cancerous polyps.  The polyps did not have cancer in them. Your next surveillance colonoscopy should be done in 3 years' time.     Copies of all the reports were sent to your referring physician.    If you have any questions regarding your colonoscopy and/or pathology results, please do not hesitate to contact me by replying to this message or calling me at [phone].  Thank you for allowing us to participate in your medical care.    Sincerely,    Dave Medina MD, KIRSTIN  Chief Medical   [facility]  Associate Chief and Director of Clinical Operations  Division of Gastroenterology and Liver Disease   Master Clinician  [facility]  Professor of Medicine  [facility]

## 2024-05-07 ENCOUNTER — TELEPHONE (OUTPATIENT)
Dept: NEPHROLOGY | Facility: CLINIC | Age: 49
End: 2024-05-07

## 2024-05-07 DIAGNOSIS — J40 BRONCHITIS: Primary | ICD-10-CM

## 2024-05-07 DIAGNOSIS — Z99.2 ESRD (END STAGE RENAL DISEASE) ON DIALYSIS (MULTI): ICD-10-CM

## 2024-05-07 DIAGNOSIS — I10 ESSENTIAL HYPERTENSION: ICD-10-CM

## 2024-05-07 DIAGNOSIS — N18.6 ESRD (END STAGE RENAL DISEASE) ON DIALYSIS (MULTI): ICD-10-CM

## 2024-05-07 RX ORDER — GUAIFENESIN/DEXTROMETHORPHAN 100-10MG/5
5 SYRUP ORAL EVERY 6 HOURS PRN
Status: SHIPPED | OUTPATIENT
Start: 2024-05-07

## 2024-05-07 RX ORDER — CALCIUM ACETATE 667 MG/1
2000 CAPSULE ORAL
Qty: 270 CAPSULE | Refills: 5 | Status: SHIPPED | OUTPATIENT
Start: 2024-05-07 | End: 2024-11-03

## 2024-05-07 RX ORDER — AZITHROMYCIN 250 MG/1
250 TABLET, FILM COATED ORAL DAILY
Qty: 3 TABLET | Refills: 0 | Status: SHIPPED | OUTPATIENT
Start: 2024-05-07 | End: 2024-05-10

## 2024-05-07 RX ORDER — VALSARTAN 320 MG/1
320 TABLET ORAL DAILY
Qty: 30 TABLET | Refills: 11 | Status: SHIPPED | OUTPATIENT
Start: 2024-05-07 | End: 2025-05-07